# Patient Record
Sex: FEMALE | Race: BLACK OR AFRICAN AMERICAN | NOT HISPANIC OR LATINO
[De-identification: names, ages, dates, MRNs, and addresses within clinical notes are randomized per-mention and may not be internally consistent; named-entity substitution may affect disease eponyms.]

---

## 2017-03-22 ENCOUNTER — APPOINTMENT (OUTPATIENT)
Dept: DERMATOLOGY | Facility: CLINIC | Age: 32
End: 2017-03-22

## 2017-03-22 VITALS — DIASTOLIC BLOOD PRESSURE: 78 MMHG | SYSTOLIC BLOOD PRESSURE: 108 MMHG

## 2017-03-22 DIAGNOSIS — Z91.89 OTHER SPECIFIED PERSONAL RISK FACTORS, NOT ELSEWHERE CLASSIFIED: ICD-10-CM

## 2017-03-23 ENCOUNTER — TRANSCRIPTION ENCOUNTER (OUTPATIENT)
Age: 32
End: 2017-03-23

## 2017-03-30 ENCOUNTER — OTHER (OUTPATIENT)
Age: 32
End: 2017-03-30

## 2017-07-25 ENCOUNTER — EMERGENCY (EMERGENCY)
Facility: HOSPITAL | Age: 32
LOS: 1 days | Discharge: HOME | End: 2017-07-25
Admitting: EMERGENCY MEDICINE
Payer: COMMERCIAL

## 2017-07-25 VITALS
OXYGEN SATURATION: 100 % | HEART RATE: 78 BPM | RESPIRATION RATE: 18 BRPM | TEMPERATURE: 98 F | DIASTOLIC BLOOD PRESSURE: 80 MMHG | SYSTOLIC BLOOD PRESSURE: 115 MMHG

## 2017-07-25 DIAGNOSIS — Z98.1 ARTHRODESIS STATUS: Chronic | ICD-10-CM

## 2017-07-25 PROCEDURE — 71020: CPT | Mod: 26

## 2017-07-25 PROCEDURE — 99284 EMERGENCY DEPT VISIT MOD MDM: CPT

## 2017-07-25 NOTE — ED PROVIDER NOTE - MEDICAL DECISION MAKING DETAILS
Sudden onset of left sided upper back pain likely musculoskeletal, PERC negative, will obtain CXR, heat, nsaids, follow up PMD.

## 2017-07-25 NOTE — ED PROVIDER NOTE - OBJECTIVE STATEMENT
33 y/o female with 31 y/o female with a hx of Asthma presents to the ER 33 y/o female with a hx of Asthma presents to the ER c/o sudden onset of left sided upper back pain x1 day.  Pt states she was at work this AM and felt a pop in her back.  Pt reports pain with inspiration.  Pt denies chest pain, shortness of breath, recent travel, OCP use, weakness, dizziness, numbness, tingling, fevers, chills, cough, trauma.

## 2017-07-25 NOTE — ED PROVIDER NOTE - PHYSICAL EXAMINATION
+TTP left sided paraspinal thoracic region no midline, TTP.  Strength 5/5 all extremities, normal gait.

## 2018-03-05 ENCOUNTER — RESULT REVIEW (OUTPATIENT)
Age: 33
End: 2018-03-05

## 2018-04-05 ENCOUNTER — ASOB RESULT (OUTPATIENT)
Age: 33
End: 2018-04-05

## 2018-04-05 ENCOUNTER — APPOINTMENT (OUTPATIENT)
Dept: ANTEPARTUM | Facility: CLINIC | Age: 33
End: 2018-04-05
Payer: COMMERCIAL

## 2018-04-05 PROCEDURE — 36416 COLLJ CAPILLARY BLOOD SPEC: CPT

## 2018-04-05 PROCEDURE — 76801 OB US < 14 WKS SINGLE FETUS: CPT

## 2018-04-05 PROCEDURE — 76813 OB US NUCHAL MEAS 1 GEST: CPT

## 2018-04-18 ENCOUNTER — APPOINTMENT (OUTPATIENT)
Dept: OBGYN | Facility: CLINIC | Age: 33
End: 2018-04-18
Payer: COMMERCIAL

## 2018-04-18 PROCEDURE — 0500F INITIAL PRENATAL CARE VISIT: CPT

## 2018-05-08 ENCOUNTER — APPOINTMENT (OUTPATIENT)
Dept: OBGYN | Facility: CLINIC | Age: 33
End: 2018-05-08
Payer: COMMERCIAL

## 2018-05-08 PROCEDURE — 0502F SUBSEQUENT PRENATAL CARE: CPT

## 2018-05-08 PROCEDURE — 36415 COLL VENOUS BLD VENIPUNCTURE: CPT

## 2018-05-15 ENCOUNTER — OUTPATIENT (OUTPATIENT)
Dept: OUTPATIENT SERVICES | Facility: HOSPITAL | Age: 33
LOS: 1 days | Discharge: HOME | End: 2018-05-15

## 2018-05-15 DIAGNOSIS — Z98.1 ARTHRODESIS STATUS: Chronic | ICD-10-CM

## 2018-05-15 DIAGNOSIS — Z53.1 PROCEDURE AND TREATMENT NOT CARRIED OUT BECAUSE OF PATIENT'S DECISION FOR REASONS OF BELIEF AND GROUP PRESSURE: ICD-10-CM

## 2018-05-17 ENCOUNTER — APPOINTMENT (OUTPATIENT)
Dept: HEMATOLOGY ONCOLOGY | Facility: CLINIC | Age: 33
End: 2018-05-17
Payer: COMMERCIAL

## 2018-05-17 ENCOUNTER — RESULT REVIEW (OUTPATIENT)
Age: 33
End: 2018-05-17

## 2018-05-17 VITALS
OXYGEN SATURATION: 100 % | RESPIRATION RATE: 16 BRPM | HEART RATE: 93 BPM | WEIGHT: 148.81 LBS | TEMPERATURE: 98.3 F | SYSTOLIC BLOOD PRESSURE: 106 MMHG | HEIGHT: 60.28 IN | BODY MASS INDEX: 28.84 KG/M2 | DIASTOLIC BLOOD PRESSURE: 71 MMHG

## 2018-05-17 DIAGNOSIS — Z80.42 FAMILY HISTORY OF MALIGNANT NEOPLASM OF PROSTATE: ICD-10-CM

## 2018-05-17 DIAGNOSIS — Z78.9 OTHER SPECIFIED HEALTH STATUS: ICD-10-CM

## 2018-05-17 DIAGNOSIS — Z80.0 FAMILY HISTORY OF MALIGNANT NEOPLASM OF DIGESTIVE ORGANS: ICD-10-CM

## 2018-05-17 DIAGNOSIS — Z80.49 FAMILY HISTORY OF MALIGNANT NEOPLASM OF OTHER GENITAL ORGANS: ICD-10-CM

## 2018-05-17 DIAGNOSIS — Z53.1 PROCEDURE AND TREATMENT NOT CARRIED OUT BECAUSE OF PATIENT'S DECISION FOR REASONS OF BELIEF AND GROUP PRESSURE: ICD-10-CM

## 2018-05-17 LAB
HCT VFR BLD CALC: 34.9 % — SIGNIFICANT CHANGE UP (ref 34.5–45)
HGB BLD-MCNC: 12.4 G/DL — SIGNIFICANT CHANGE UP (ref 11.5–15.5)
MCHC RBC-ENTMCNC: 31.4 PG — SIGNIFICANT CHANGE UP (ref 27–34)
MCHC RBC-ENTMCNC: 35.6 G/DL — SIGNIFICANT CHANGE UP (ref 32–36)
MCV RBC AUTO: 88.1 FL — SIGNIFICANT CHANGE UP (ref 80–100)
PLATELET # BLD AUTO: 208 K/UL — SIGNIFICANT CHANGE UP (ref 150–400)
RBC # BLD: 3.96 M/UL — SIGNIFICANT CHANGE UP (ref 3.8–5.2)
RBC # FLD: 12.5 % — SIGNIFICANT CHANGE UP (ref 10.3–14.5)
WBC # BLD: 9.5 K/UL — SIGNIFICANT CHANGE UP (ref 3.8–10.5)
WBC # FLD AUTO: 9.5 K/UL — SIGNIFICANT CHANGE UP (ref 3.8–10.5)

## 2018-05-17 PROCEDURE — 99244 OFF/OP CNSLTJ NEW/EST MOD 40: CPT

## 2018-05-21 ENCOUNTER — NON-APPOINTMENT (OUTPATIENT)
Age: 33
End: 2018-05-21

## 2018-05-21 ENCOUNTER — APPOINTMENT (OUTPATIENT)
Dept: CARDIOLOGY | Facility: CLINIC | Age: 33
End: 2018-05-21
Payer: COMMERCIAL

## 2018-05-21 VITALS — HEART RATE: 96 BPM | DIASTOLIC BLOOD PRESSURE: 74 MMHG | SYSTOLIC BLOOD PRESSURE: 107 MMHG

## 2018-05-21 PROCEDURE — 93000 ELECTROCARDIOGRAM COMPLETE: CPT

## 2018-05-21 PROCEDURE — 99205 OFFICE O/P NEW HI 60 MIN: CPT

## 2018-06-04 ENCOUNTER — APPOINTMENT (OUTPATIENT)
Dept: OBGYN | Facility: CLINIC | Age: 33
End: 2018-06-04
Payer: COMMERCIAL

## 2018-06-04 PROCEDURE — 0502F SUBSEQUENT PRENATAL CARE: CPT

## 2018-06-05 ENCOUNTER — APPOINTMENT (OUTPATIENT)
Dept: ANTEPARTUM | Facility: CLINIC | Age: 33
End: 2018-06-05
Payer: COMMERCIAL

## 2018-06-05 ENCOUNTER — ASOB RESULT (OUTPATIENT)
Age: 33
End: 2018-06-05

## 2018-06-05 ENCOUNTER — OUTPATIENT (OUTPATIENT)
Dept: OUTPATIENT SERVICES | Facility: HOSPITAL | Age: 33
LOS: 1 days | End: 2018-06-05
Payer: COMMERCIAL

## 2018-06-05 DIAGNOSIS — Z3A.00 WEEKS OF GESTATION OF PREGNANCY NOT SPECIFIED: ICD-10-CM

## 2018-06-05 DIAGNOSIS — Z98.1 ARTHRODESIS STATUS: Chronic | ICD-10-CM

## 2018-06-05 DIAGNOSIS — O26.899 OTHER SPECIFIED PREGNANCY RELATED CONDITIONS, UNSPECIFIED TRIMESTER: ICD-10-CM

## 2018-06-05 PROCEDURE — 76811 OB US DETAILED SNGL FETUS: CPT

## 2018-06-05 PROCEDURE — 76817 TRANSVAGINAL US OBSTETRIC: CPT | Mod: 59

## 2018-06-05 PROCEDURE — G0463: CPT

## 2018-06-05 NOTE — CONSULT NOTE ADULT - SUBJECTIVE AND OBJECTIVE BOX
Dear Dr. Donohue    I had the pleasure of seeing Ms. Rae Cedeño in consultation today.  As you know, she is a  at 21  weeks gestation.  She presents today with a chief complaint of  being Jehovah Witness                                                     .          Past Medical History: scoliosis    Past Surgical History: spinal fusion for scoliosis  at  [facility]     Family History of Anesthetic Problems:   lethargic , difficulty breathing, resuscitated     Medications: prenatal vitamin daily, calcium daily    Allergies: NKDA     Physical Examination:  Height:   5'0'       Weight:     68.9 kg          Airway: M2 good opening , 3 FB    Impression: Dear Dr. Donohue    I had the pleasure of seeing Ms. Rae Cedeño in consultation today.  As you know, she is a  at 21  weeks gestation.  She presents today with a chief complaint of  being Jehovah Witness  , pt seen   hematologist Dr Katherine Morse for consultation                                                .          Past Medical History: scoliosis    Past Surgical History: spinal fusion for scoliosis  at  [facility]     Family History of Anesthetic Problems:   lethargic , difficulty breathing, resuscitated     Medications: prenatal vitamin daily, calcium daily    Allergies: NKDA     Physical Examination:  Height:   5'0'       Weight:     68.9 kg          Airway: M2 good opening , 3 FB    Impression:  This is a 33 y/o woman  with  with  EDC  10/14/2018 Dear Dr. Donohue    I had the pleasure of seeing Ms. Rae Cedeño in consultation today.  As you know, she is a  at 21  weeks gestation.  She presents today with a chief complaint of  being Jehovah Witness.                                              .          Past Medical History: scoliosis    Past Surgical History: spinal fusion for scoliosis  at  [facility]     Family History of Anesthetic Problems:   lethargic , difficulty breathing, resuscitated     Medications: prenatal vitamin daily, calcium daily    Allergies: NKDA     Physical Examination:  Height:   5'0'       Weight:     68.9 kg          Airway: M2 good opening , 3 FB    Impression:  This is a 31 y/o woman  with  with  EDC  10/14/2018,  healthy young woman who does not accept all variety of blood products. She has met with a hematologist Dr Katherine Morse  to discuss in detail the risks and benefits of a variety of blood components and man-made blood and coagulation substitutes. She understands that we will abide by her wishes regarding what she will and will not accept and will adhere to such wishes even in the event of death.     Thank you for the courtesy of this consultation. The final plan is per the on call team. Dear Dr. Donohue    I had the pleasure of seeing Ms. Rae Cedeño in consultation today.  As you know, she is a  at 21  weeks gestation.  She presents today with a chief complaint of  being Jehovah Witness.                                              .          Past Medical History: scoliosis    Past Surgical History: spinal fusion for scoliosis  at  [facility]     Family History of Anesthetic Problems:   lethargic , difficulty breathing, resuscitated     Medications: prenatal vitamin daily, calcium daily    Allergies: NKDA     Physical Examination:  Height:   5'0'       Weight:     68.9 kg          Airway: M2 good opening , 3 FB    Impression:  This is a 31 y/o woman  with  with  EDC  10/14/2018,  healthy young woman who does not accept all variety of blood products. She has met with a hematologist Dr Katherine Morse  to discuss in detail the risks and benefits of a variety of blood components and man-made blood and coagulation substitutes. She understands that we will abide by her wishes regarding what she will and will not accept and will adhere to such wishes even in the event of death.  Will obtain old anesthesia record from [facility] for complications with spinal fusion surgery    Thank you for the courtesy of this consultation. The final plan is per the on call team.     Sincerely,  Loren Snow NP Dear Dr. Donohue    I had the pleasure of seeing Ms. Rae Cedeño in consultation today.  As you know, she is a  at 21  weeks gestation.  She presents today with a chief complaint of  being Jehovah Witness.                                              .      Past Medical History: scoliosis    Past Surgical History: spinal fusion for scoliosis  at  [facility]     Family History of Anesthetic Problems:   lethargic , difficulty breathing, resuscitated     Medications: prenatal vitamin daily, calcium daily    Allergies: NKDA     Physical Examination:  Height:   5'0'       Weight:     68.9 kg          Airway: M2 good opening , 3 FB    Impression:  This is a 33 y/o woman  with  with  EDC  10/14/2018,  healthy young woman who does not accept all variety of blood products. She has met with a hematologist Dr Katherine Morse  to discuss in detail the risks and benefits of a variety of blood components and man-made blood and coagulation substitutes. She understands that we will abide by her wishes regarding what she will and will not accept and will adhere to such wishes even in the event of death.  Will obtain old anesthesia record from [facility] for complications with spinal fusion surgery    Thank you for the courtesy of this consultation. The final plan is per the on call team.     Sincerely,  Loren Snow NP Dear Dr. Donohue    I had the pleasure of seeing Ms. Rae Cedeño in consultation today.  As you know, she is a  at 21  weeks gestation.  She presents today for consult as she is a Mandaen and has a history of thoracic spinal fusion for scoliosis.  Since surgery, continues to have occasional back pain that sometimes radiates down her left leg.  Denies weakness.  Last seen by a physician for her spine in .                                                .    Past Medical History: scoliosis, childhood asthma    Past Surgical History: spinal fusion for scoliosis  at  [facility] .  States she had post-operative issues including "not waking up for 2 days" and having a "flatline."  Does not know the details.      Medications: prenatal vitamin daily, calcium daily    Allergies: NKDA     Physical Examination:  Height:   5'0'       Weight:     68.9 kg          Airway: M2 good opening , 3 FB    Impression:  This is a 33 y/o woman  with  with  EDC  10/14/2018,  healthy young woman who does not accept all variety of blood products. She has met with a hematologist Dr Katherine Morse  to discuss in detail the risks and benefits of a variety of blood components and man-made blood and coagulation substitutes. She understands that we will abide by her wishes regarding what she will and will not accept and will adhere to such wishes even in the event of death.  Will attempt to obtain old record from [facility] for complications with spinal fusion surgery.  Recommended that patient by a physician for her spine prior to delivery.  Patient instructed to bring consulation from physician as well as any imaging studies done.    Thank you for the courtesy of this consultation. The final plan is per the on call team.     Sincerely,  Loren Snow NP

## 2018-06-17 ENCOUNTER — EMERGENCY (EMERGENCY)
Facility: HOSPITAL | Age: 33
LOS: 1 days | Discharge: HOME | End: 2018-06-17
Attending: EMERGENCY MEDICINE | Admitting: EMERGENCY MEDICINE
Payer: COMMERCIAL

## 2018-06-17 VITALS
TEMPERATURE: 98 F | HEART RATE: 98 BPM | DIASTOLIC BLOOD PRESSURE: 79 MMHG | RESPIRATION RATE: 17 BRPM | OXYGEN SATURATION: 100 % | SYSTOLIC BLOOD PRESSURE: 112 MMHG

## 2018-06-17 VITALS
HEART RATE: 95 BPM | OXYGEN SATURATION: 100 % | TEMPERATURE: 98 F | DIASTOLIC BLOOD PRESSURE: 77 MMHG | RESPIRATION RATE: 16 BRPM | SYSTOLIC BLOOD PRESSURE: 115 MMHG

## 2018-06-17 DIAGNOSIS — Z98.1 ARTHRODESIS STATUS: Chronic | ICD-10-CM

## 2018-06-17 PROCEDURE — 99282 EMERGENCY DEPT VISIT SF MDM: CPT | Mod: 25

## 2018-06-17 PROCEDURE — 64450 NJX AA&/STRD OTHER PN/BRANCH: CPT | Mod: F3

## 2018-06-17 NOTE — ED PROCEDURE NOTE - GENERAL PROCEDURE DETAILS
Using ring cutter, ring was cut in one place and opened further using curved clams. Ring then removed from external finger, assisted by lubricant jelly.

## 2018-06-17 NOTE — ED ADULT NURSE NOTE - OBJECTIVE STATEMENT
pt received to the ed came in c/o of left ring finger swelling-unable to get ring off finger. no deficits in movement, pt endorses feeling in finger, finger feels warm to touch, slightly swollen around ring. pt denies tingling or any other symptoms. pt is 5 months pregnant and reports swelling to fingers normally but this time "worse". partner at bedside, will continue to monitor.

## 2018-06-17 NOTE — ED PROVIDER NOTE - PROGRESS NOTE DETAILS
Richelle att: Ring block performed and area tamponaded with string in anticipation of ring removal. Chris Babb MD PGY4: Pt signed out to me after multiple failed attempts to remove ring using conventional methods. This was deemed to be futile and I cut and removed the ring. Provided return precautions. Instructed pt to take ring to [facility] to attempt reconstruction.

## 2018-06-17 NOTE — ED PROVIDER NOTE - OBJECTIVE STATEMENT
05:40 Richelle att: 32F 05:40 Richelle att: 32F p/w ring stuck on finger. Patient reports since 2A unable to remove her wedding and engagement ring from her fourth finger. REports sensation intact. Of note patient is 5 months pregnant, denies abd or pelvic pain, contractions, gush of fluid, or vag bleed.

## 2018-06-17 NOTE — ED ADULT TRIAGE NOTE - CHIEF COMPLAINT QUOTE
Swelling and pain due to inability to take ring off 4th digit of right hand x 20-30minutes. Unable to assess cap refill due to acrylic nails denies any tingling. Patient is also 5months pregnant denies any ob complaints. Able to move affected digit. Denies Med Hx

## 2018-06-18 ENCOUNTER — NON-APPOINTMENT (OUTPATIENT)
Age: 33
End: 2018-06-18

## 2018-06-18 ENCOUNTER — APPOINTMENT (OUTPATIENT)
Dept: CARDIOLOGY | Facility: CLINIC | Age: 33
End: 2018-06-18
Payer: COMMERCIAL

## 2018-06-18 VITALS — SYSTOLIC BLOOD PRESSURE: 108 MMHG | DIASTOLIC BLOOD PRESSURE: 75 MMHG | HEART RATE: 103 BPM

## 2018-06-18 PROCEDURE — 99214 OFFICE O/P EST MOD 30 MIN: CPT

## 2018-06-18 PROCEDURE — 93000 ELECTROCARDIOGRAM COMPLETE: CPT

## 2018-06-29 ENCOUNTER — APPOINTMENT (OUTPATIENT)
Dept: CV DIAGNOSITCS | Facility: HOSPITAL | Age: 33
End: 2018-06-29

## 2018-06-29 ENCOUNTER — OUTPATIENT (OUTPATIENT)
Dept: OUTPATIENT SERVICES | Facility: HOSPITAL | Age: 33
LOS: 1 days | End: 2018-06-29
Payer: COMMERCIAL

## 2018-06-29 DIAGNOSIS — R00.2 PALPITATIONS: ICD-10-CM

## 2018-06-29 DIAGNOSIS — Z98.1 ARTHRODESIS STATUS: Chronic | ICD-10-CM

## 2018-06-29 PROCEDURE — 93306 TTE W/DOPPLER COMPLETE: CPT | Mod: 26

## 2018-06-29 PROCEDURE — 93306 TTE W/DOPPLER COMPLETE: CPT

## 2018-07-02 ENCOUNTER — APPOINTMENT (OUTPATIENT)
Dept: OBGYN | Facility: CLINIC | Age: 33
End: 2018-07-02
Payer: COMMERCIAL

## 2018-07-02 PROCEDURE — 0502F SUBSEQUENT PRENATAL CARE: CPT

## 2018-07-13 ENCOUNTER — APPOINTMENT (OUTPATIENT)
Dept: PHYSICAL MEDICINE AND REHAB | Facility: CLINIC | Age: 33
End: 2018-07-13
Payer: COMMERCIAL

## 2018-07-13 VITALS
OXYGEN SATURATION: 100 % | HEART RATE: 103 BPM | DIASTOLIC BLOOD PRESSURE: 80 MMHG | TEMPERATURE: 98.1 F | SYSTOLIC BLOOD PRESSURE: 114 MMHG

## 2018-07-13 PROCEDURE — 99203 OFFICE O/P NEW LOW 30 MIN: CPT

## 2018-07-23 ENCOUNTER — NON-APPOINTMENT (OUTPATIENT)
Age: 33
End: 2018-07-23

## 2018-07-23 ENCOUNTER — APPOINTMENT (OUTPATIENT)
Dept: CARDIOLOGY | Facility: CLINIC | Age: 33
End: 2018-07-23
Payer: COMMERCIAL

## 2018-07-23 VITALS
HEIGHT: 60 IN | OXYGEN SATURATION: 98 % | WEIGHT: 160 LBS | SYSTOLIC BLOOD PRESSURE: 109 MMHG | HEART RATE: 99 BPM | BODY MASS INDEX: 31.41 KG/M2 | DIASTOLIC BLOOD PRESSURE: 73 MMHG

## 2018-07-23 PROCEDURE — 99214 OFFICE O/P EST MOD 30 MIN: CPT

## 2018-07-23 PROCEDURE — 93000 ELECTROCARDIOGRAM COMPLETE: CPT

## 2018-07-24 ENCOUNTER — APPOINTMENT (OUTPATIENT)
Dept: OBGYN | Facility: CLINIC | Age: 33
End: 2018-07-24
Payer: COMMERCIAL

## 2018-07-24 PROCEDURE — 36415 COLL VENOUS BLD VENIPUNCTURE: CPT

## 2018-07-24 PROCEDURE — 0502F SUBSEQUENT PRENATAL CARE: CPT

## 2018-08-08 ENCOUNTER — APPOINTMENT (OUTPATIENT)
Dept: OBGYN | Facility: CLINIC | Age: 33
End: 2018-08-08
Payer: COMMERCIAL

## 2018-08-08 PROCEDURE — 0502F SUBSEQUENT PRENATAL CARE: CPT

## 2018-08-14 ENCOUNTER — ASOB RESULT (OUTPATIENT)
Age: 33
End: 2018-08-14

## 2018-08-14 ENCOUNTER — APPOINTMENT (OUTPATIENT)
Dept: MATERNAL FETAL MEDICINE | Facility: CLINIC | Age: 33
End: 2018-08-14
Payer: COMMERCIAL

## 2018-08-14 PROCEDURE — G0109 DIAB MANAGE TRN IND/GROUP: CPT

## 2018-08-15 ENCOUNTER — APPOINTMENT (OUTPATIENT)
Dept: MATERNAL FETAL MEDICINE | Facility: CLINIC | Age: 33
End: 2018-08-15
Payer: COMMERCIAL

## 2018-08-15 PROCEDURE — G0109 DIAB MANAGE TRN IND/GROUP: CPT

## 2018-08-17 ENCOUNTER — APPOINTMENT (OUTPATIENT)
Dept: OBGYN | Facility: CLINIC | Age: 33
End: 2018-08-17
Payer: COMMERCIAL

## 2018-08-17 PROCEDURE — 76816 OB US FOLLOW-UP PER FETUS: CPT

## 2018-08-17 PROCEDURE — 76819 FETAL BIOPHYS PROFIL W/O NST: CPT

## 2018-08-21 ENCOUNTER — ASOB RESULT (OUTPATIENT)
Age: 33
End: 2018-08-21

## 2018-08-21 ENCOUNTER — APPOINTMENT (OUTPATIENT)
Dept: ANTEPARTUM | Facility: CLINIC | Age: 33
End: 2018-08-21
Payer: COMMERCIAL

## 2018-08-21 PROCEDURE — 76816 OB US FOLLOW-UP PER FETUS: CPT

## 2018-08-23 ENCOUNTER — ASOB RESULT (OUTPATIENT)
Age: 33
End: 2018-08-23

## 2018-08-23 ENCOUNTER — APPOINTMENT (OUTPATIENT)
Dept: MATERNAL FETAL MEDICINE | Facility: CLINIC | Age: 33
End: 2018-08-23
Payer: COMMERCIAL

## 2018-08-23 PROCEDURE — G0108 DIAB MANAGE TRN  PER INDIV: CPT

## 2018-08-24 ENCOUNTER — APPOINTMENT (OUTPATIENT)
Dept: OBGYN | Facility: CLINIC | Age: 33
End: 2018-08-24
Payer: COMMERCIAL

## 2018-08-24 PROCEDURE — 59025 FETAL NON-STRESS TEST: CPT

## 2018-08-24 PROCEDURE — 0502F SUBSEQUENT PRENATAL CARE: CPT

## 2018-08-27 ENCOUNTER — APPOINTMENT (OUTPATIENT)
Dept: ANTEPARTUM | Facility: CLINIC | Age: 33
End: 2018-08-27
Payer: COMMERCIAL

## 2018-08-27 ENCOUNTER — ASOB RESULT (OUTPATIENT)
Age: 33
End: 2018-08-27

## 2018-08-27 PROCEDURE — 76819 FETAL BIOPHYS PROFIL W/O NST: CPT

## 2018-08-29 ENCOUNTER — APPOINTMENT (OUTPATIENT)
Dept: OBGYN | Facility: CLINIC | Age: 33
End: 2018-08-29
Payer: COMMERCIAL

## 2018-08-29 ENCOUNTER — OUTPATIENT (OUTPATIENT)
Dept: OUTPATIENT SERVICES | Facility: HOSPITAL | Age: 33
LOS: 1 days | End: 2018-08-29

## 2018-08-29 ENCOUNTER — EMERGENCY (EMERGENCY)
Facility: HOSPITAL | Age: 33
LOS: 1 days | Discharge: STILL ADMITTED OR ADMINISTRATIVE | End: 2018-08-29
Admitting: EMERGENCY MEDICINE

## 2018-08-29 VITALS
RESPIRATION RATE: 18 BRPM | OXYGEN SATURATION: 98 % | HEART RATE: 120 BPM | SYSTOLIC BLOOD PRESSURE: 108 MMHG | DIASTOLIC BLOOD PRESSURE: 81 MMHG | TEMPERATURE: 98 F

## 2018-08-29 DIAGNOSIS — Z98.1 ARTHRODESIS STATUS: Chronic | ICD-10-CM

## 2018-08-29 DIAGNOSIS — Z3A.00 WEEKS OF GESTATION OF PREGNANCY NOT SPECIFIED: ICD-10-CM

## 2018-08-29 DIAGNOSIS — O26.90 PREGNANCY RELATED CONDITIONS, UNSPECIFIED, UNSPECIFIED TRIMESTER: ICD-10-CM

## 2018-08-29 PROCEDURE — 36415 COLL VENOUS BLD VENIPUNCTURE: CPT

## 2018-08-29 PROCEDURE — 0502F SUBSEQUENT PRENATAL CARE: CPT

## 2018-08-29 RX ADMIN — Medication 10 MILLIGRAM(S): at 02:55

## 2018-08-29 NOTE — ED ADULT TRIAGE NOTE - CHIEF COMPLAINT QUOTE
"I have severe constipation. I think I may have fecal impaction." Patient is  approx. 33 weeks pregnant c/o constipation with last BM 7 days ago on . Patient took Colace yesterday and today, patient began to have urge to move bowels, but is still unable to. Denies nausea, vomiting, diarrhea, dysuria, hematuria, vaginal bleeding/discharge. "I have severe constipation. I think I may have fecal impaction." Patient is  approx. 33 weeks pregnant c/o constipation with last BM 7 days ago on . Patient took Colace yesterday and today, patient began to have urge to move bowels, but is still unable to. Denies nausea, vomiting, diarrhea, dysuria, hematuria, vaginal bleeding/discharge. Called L&D, spoke with Ginny, patient to be evaluated in L&D.

## 2018-08-29 NOTE — ED ADULT NURSE NOTE - CHIEF COMPLAINT QUOTE
"I have severe constipation. I think I may have fecal impaction." Patient is  approx. 33 weeks pregnant c/o constipation with last BM 7 days ago on . Patient took Colace yesterday and today, patient began to have urge to move bowels, but is still unable to. Denies nausea, vomiting, diarrhea, dysuria, hematuria, vaginal bleeding/discharge. Called L&D, spoke with Ginny, patient to be evaluated in L&D.

## 2018-08-31 ENCOUNTER — APPOINTMENT (OUTPATIENT)
Dept: OBGYN | Facility: CLINIC | Age: 33
End: 2018-08-31

## 2018-09-05 ENCOUNTER — APPOINTMENT (OUTPATIENT)
Dept: ANTEPARTUM | Facility: CLINIC | Age: 33
End: 2018-09-05

## 2018-09-06 ENCOUNTER — APPOINTMENT (OUTPATIENT)
Dept: OBGYN | Facility: CLINIC | Age: 33
End: 2018-09-06
Payer: COMMERCIAL

## 2018-09-06 PROCEDURE — 90715 TDAP VACCINE 7 YRS/> IM: CPT

## 2018-09-06 PROCEDURE — 0502F SUBSEQUENT PRENATAL CARE: CPT

## 2018-09-06 PROCEDURE — 90471 IMMUNIZATION ADMIN: CPT

## 2018-09-06 PROCEDURE — 59025 FETAL NON-STRESS TEST: CPT

## 2018-09-07 ENCOUNTER — APPOINTMENT (OUTPATIENT)
Dept: MATERNAL FETAL MEDICINE | Facility: CLINIC | Age: 33
End: 2018-09-07
Payer: COMMERCIAL

## 2018-09-07 ENCOUNTER — ASOB RESULT (OUTPATIENT)
Age: 33
End: 2018-09-07

## 2018-09-07 PROCEDURE — G0108 DIAB MANAGE TRN  PER INDIV: CPT

## 2018-09-10 ENCOUNTER — APPOINTMENT (OUTPATIENT)
Dept: OBGYN | Facility: CLINIC | Age: 33
End: 2018-09-10
Payer: COMMERCIAL

## 2018-09-10 ENCOUNTER — APPOINTMENT (OUTPATIENT)
Dept: CARDIOLOGY | Facility: CLINIC | Age: 33
End: 2018-09-10

## 2018-09-10 PROCEDURE — 0502F SUBSEQUENT PRENATAL CARE: CPT

## 2018-09-10 PROCEDURE — 59025 FETAL NON-STRESS TEST: CPT

## 2018-09-19 ENCOUNTER — APPOINTMENT (OUTPATIENT)
Dept: OBGYN | Facility: CLINIC | Age: 33
End: 2018-09-19
Payer: COMMERCIAL

## 2018-09-19 PROCEDURE — 76816 OB US FOLLOW-UP PER FETUS: CPT

## 2018-09-19 PROCEDURE — 0502F SUBSEQUENT PRENATAL CARE: CPT

## 2018-09-19 PROCEDURE — 59025 FETAL NON-STRESS TEST: CPT

## 2018-09-21 ENCOUNTER — APPOINTMENT (OUTPATIENT)
Dept: MATERNAL FETAL MEDICINE | Facility: CLINIC | Age: 33
End: 2018-09-21
Payer: COMMERCIAL

## 2018-09-21 ENCOUNTER — ASOB RESULT (OUTPATIENT)
Age: 33
End: 2018-09-21

## 2018-09-21 PROCEDURE — G0108 DIAB MANAGE TRN  PER INDIV: CPT

## 2018-09-27 ENCOUNTER — APPOINTMENT (OUTPATIENT)
Dept: OBGYN | Facility: CLINIC | Age: 33
End: 2018-09-27
Payer: COMMERCIAL

## 2018-09-27 PROCEDURE — 0502F SUBSEQUENT PRENATAL CARE: CPT

## 2018-09-27 PROCEDURE — 76818 FETAL BIOPHYS PROFILE W/NST: CPT

## 2018-10-03 ENCOUNTER — APPOINTMENT (OUTPATIENT)
Dept: OBGYN | Facility: CLINIC | Age: 33
End: 2018-10-03
Payer: COMMERCIAL

## 2018-10-03 PROCEDURE — 0502F SUBSEQUENT PRENATAL CARE: CPT

## 2018-10-03 PROCEDURE — 76818 FETAL BIOPHYS PROFILE W/NST: CPT

## 2018-10-05 ENCOUNTER — ASOB RESULT (OUTPATIENT)
Age: 33
End: 2018-10-05

## 2018-10-05 ENCOUNTER — APPOINTMENT (OUTPATIENT)
Dept: MATERNAL FETAL MEDICINE | Facility: CLINIC | Age: 33
End: 2018-10-05
Payer: COMMERCIAL

## 2018-10-05 PROCEDURE — G0108 DIAB MANAGE TRN  PER INDIV: CPT

## 2018-10-11 ENCOUNTER — APPOINTMENT (OUTPATIENT)
Dept: OBGYN | Facility: CLINIC | Age: 33
End: 2018-10-11
Payer: COMMERCIAL

## 2018-10-11 PROCEDURE — 76816 OB US FOLLOW-UP PER FETUS: CPT

## 2018-10-11 PROCEDURE — 76818 FETAL BIOPHYS PROFILE W/NST: CPT

## 2018-10-11 PROCEDURE — 0502F SUBSEQUENT PRENATAL CARE: CPT

## 2018-10-17 ENCOUNTER — INPATIENT (INPATIENT)
Facility: HOSPITAL | Age: 33
LOS: 3 days | Discharge: HOME | End: 2018-10-21
Attending: OBSTETRICS & GYNECOLOGY | Admitting: OBSTETRICS & GYNECOLOGY
Payer: COMMERCIAL

## 2018-10-17 VITALS — WEIGHT: 160.94 LBS | HEIGHT: 59 IN

## 2018-10-17 DIAGNOSIS — O24.410 GESTATIONAL DIABETES MELLITUS IN PREGNANCY, DIET CONTROLLED: ICD-10-CM

## 2018-10-17 DIAGNOSIS — Z98.1 ARTHRODESIS STATUS: Chronic | ICD-10-CM

## 2018-10-17 LAB
BASOPHILS # BLD AUTO: 0.1 K/UL — SIGNIFICANT CHANGE UP (ref 0–0.2)
BASOPHILS NFR BLD AUTO: 1.1 % — SIGNIFICANT CHANGE UP (ref 0–2)
BLD GP AB SCN SERPL QL: NEGATIVE — SIGNIFICANT CHANGE UP
EOSINOPHIL # BLD AUTO: 0 K/UL — SIGNIFICANT CHANGE UP (ref 0–0.5)
EOSINOPHIL NFR BLD AUTO: 0.9 % — SIGNIFICANT CHANGE UP (ref 0–6)
GLUCOSE BLDC GLUCOMTR-MCNC: 73 MG/DL — SIGNIFICANT CHANGE UP (ref 70–99)
GLUCOSE BLDC GLUCOMTR-MCNC: 74 MG/DL — SIGNIFICANT CHANGE UP (ref 70–99)
GLUCOSE BLDC GLUCOMTR-MCNC: 77 MG/DL — SIGNIFICANT CHANGE UP (ref 70–99)
GLUCOSE BLDC GLUCOMTR-MCNC: 77 MG/DL — SIGNIFICANT CHANGE UP (ref 70–99)
GLUCOSE BLDC GLUCOMTR-MCNC: 86 MG/DL — SIGNIFICANT CHANGE UP (ref 70–99)
GLUCOSE BLDC GLUCOMTR-MCNC: 87 MG/DL — SIGNIFICANT CHANGE UP (ref 70–99)
GLUCOSE BLDC GLUCOMTR-MCNC: 89 MG/DL — SIGNIFICANT CHANGE UP (ref 70–99)
HCT VFR BLD CALC: 42.3 % — SIGNIFICANT CHANGE UP (ref 34.5–45)
HGB BLD-MCNC: 14.4 G/DL — SIGNIFICANT CHANGE UP (ref 11.5–15.5)
LYMPHOCYTES # BLD AUTO: 1.9 K/UL — SIGNIFICANT CHANGE UP (ref 1–3.3)
LYMPHOCYTES # BLD AUTO: 37.5 % — SIGNIFICANT CHANGE UP (ref 13–44)
MCHC RBC-ENTMCNC: 30.2 PG — SIGNIFICANT CHANGE UP (ref 27–34)
MCHC RBC-ENTMCNC: 34 GM/DL — SIGNIFICANT CHANGE UP (ref 32–36)
MCV RBC AUTO: 89 FL — SIGNIFICANT CHANGE UP (ref 80–100)
MONOCYTES # BLD AUTO: 0.5 K/UL — SIGNIFICANT CHANGE UP (ref 0–0.9)
MONOCYTES NFR BLD AUTO: 8.8 % — SIGNIFICANT CHANGE UP (ref 2–14)
NEUTROPHILS # BLD AUTO: 2.7 K/UL — SIGNIFICANT CHANGE UP (ref 1.8–7.4)
NEUTROPHILS NFR BLD AUTO: 51.7 % — SIGNIFICANT CHANGE UP (ref 43–77)
PLATELET # BLD AUTO: 232 K/UL — SIGNIFICANT CHANGE UP (ref 150–400)
RBC # BLD: 4.75 M/UL — SIGNIFICANT CHANGE UP (ref 3.8–5.2)
RBC # FLD: 13.6 % — SIGNIFICANT CHANGE UP (ref 10.3–14.5)
RH IG SCN BLD-IMP: POSITIVE — SIGNIFICANT CHANGE UP
T PALLIDUM AB TITR SER: NEGATIVE — SIGNIFICANT CHANGE UP
WBC # BLD: 5.2 K/UL — SIGNIFICANT CHANGE UP (ref 3.8–10.5)
WBC # FLD AUTO: 5.2 K/UL — SIGNIFICANT CHANGE UP (ref 3.8–10.5)

## 2018-10-17 RX ORDER — SODIUM CHLORIDE 9 MG/ML
1000 INJECTION, SOLUTION INTRAVENOUS
Qty: 0 | Refills: 0 | Status: DISCONTINUED | OUTPATIENT
Start: 2018-10-17 | End: 2018-10-19

## 2018-10-17 RX ORDER — OXYTOCIN 10 UNIT/ML
4 VIAL (ML) INJECTION
Qty: 30 | Refills: 0 | Status: DISCONTINUED | OUTPATIENT
Start: 2018-10-17 | End: 2018-10-18

## 2018-10-17 RX ORDER — ONDANSETRON 8 MG/1
4 TABLET, FILM COATED ORAL ONCE
Qty: 0 | Refills: 0 | Status: DISCONTINUED | OUTPATIENT
Start: 2018-10-17 | End: 2018-10-21

## 2018-10-17 RX ORDER — SODIUM CHLORIDE 9 MG/ML
500 INJECTION INTRAMUSCULAR; INTRAVENOUS; SUBCUTANEOUS ONCE
Qty: 0 | Refills: 0 | Status: DISCONTINUED | OUTPATIENT
Start: 2018-10-17 | End: 2018-10-19

## 2018-10-17 RX ORDER — SODIUM CHLORIDE 9 MG/ML
1000 INJECTION INTRAMUSCULAR; INTRAVENOUS; SUBCUTANEOUS
Qty: 0 | Refills: 0 | Status: DISCONTINUED | OUTPATIENT
Start: 2018-10-17 | End: 2018-10-19

## 2018-10-17 RX ORDER — CITRIC ACID/SODIUM CITRATE 300-500 MG
15 SOLUTION, ORAL ORAL EVERY 4 HOURS
Qty: 0 | Refills: 0 | Status: DISCONTINUED | OUTPATIENT
Start: 2018-10-17 | End: 2018-10-18

## 2018-10-17 RX ORDER — SODIUM CHLORIDE 9 MG/ML
500 INJECTION, SOLUTION INTRAVENOUS ONCE
Qty: 0 | Refills: 0 | Status: DISCONTINUED | OUTPATIENT
Start: 2018-10-17 | End: 2018-10-17

## 2018-10-17 RX ORDER — INFLUENZA VIRUS VACCINE 15; 15; 15; 15 UG/.5ML; UG/.5ML; UG/.5ML; UG/.5ML
0.5 SUSPENSION INTRAMUSCULAR ONCE
Qty: 0 | Refills: 0 | Status: DISCONTINUED | OUTPATIENT
Start: 2018-10-17 | End: 2018-10-21

## 2018-10-17 RX ORDER — OXYTOCIN 10 UNIT/ML
333.33 VIAL (ML) INJECTION
Qty: 20 | Refills: 0 | Status: DISCONTINUED | OUTPATIENT
Start: 2018-10-17 | End: 2018-10-18

## 2018-10-17 RX ORDER — SODIUM CHLORIDE 9 MG/ML
1000 INJECTION, SOLUTION INTRAVENOUS
Qty: 0 | Refills: 0 | Status: DISCONTINUED | OUTPATIENT
Start: 2018-10-17 | End: 2018-10-17

## 2018-10-17 RX ADMIN — SODIUM CHLORIDE 125 MILLILITER(S): 9 INJECTION, SOLUTION INTRAVENOUS at 00:50

## 2018-10-17 RX ADMIN — SODIUM CHLORIDE 125 MILLILITER(S): 9 INJECTION INTRAMUSCULAR; INTRAVENOUS; SUBCUTANEOUS at 00:50

## 2018-10-17 RX ADMIN — SODIUM CHLORIDE 125 MILLILITER(S): 9 INJECTION, SOLUTION INTRAVENOUS at 08:47

## 2018-10-17 RX ADMIN — Medication 15 MILLILITER(S): at 13:07

## 2018-10-17 RX ADMIN — Medication 15 MILLILITER(S): at 08:58

## 2018-10-17 RX ADMIN — Medication 4 MILLIUNIT(S)/MIN: at 23:23

## 2018-10-18 LAB
ALBUMIN SERPL ELPH-MCNC: 2.5 G/DL — LOW (ref 3.3–5)
ALP SERPL-CCNC: 155 U/L — HIGH (ref 40–120)
ALT FLD-CCNC: 17 U/L — SIGNIFICANT CHANGE UP (ref 10–45)
ANION GAP SERPL CALC-SCNC: 10 MMOL/L — SIGNIFICANT CHANGE UP (ref 5–17)
APTT BLD: 23.8 SEC — LOW (ref 27.5–37.4)
APTT BLD: 30.1 SEC — SIGNIFICANT CHANGE UP (ref 27.5–37.4)
AST SERPL-CCNC: 24 U/L — SIGNIFICANT CHANGE UP (ref 10–40)
BASOPHILS # BLD AUTO: 0 K/UL — SIGNIFICANT CHANGE UP (ref 0–0.2)
BASOPHILS # BLD AUTO: 0 K/UL — SIGNIFICANT CHANGE UP (ref 0–0.2)
BASOPHILS NFR BLD AUTO: 0.1 % — SIGNIFICANT CHANGE UP (ref 0–2)
BASOPHILS NFR BLD AUTO: 0.3 % — SIGNIFICANT CHANGE UP (ref 0–2)
BILIRUB SERPL-MCNC: 1.2 MG/DL — SIGNIFICANT CHANGE UP (ref 0.2–1.2)
BUN SERPL-MCNC: 5 MG/DL — LOW (ref 7–23)
CALCIUM SERPL-MCNC: 8.6 MG/DL — SIGNIFICANT CHANGE UP (ref 8.4–10.5)
CHLORIDE SERPL-SCNC: 117 MMOL/L — HIGH (ref 96–108)
CO2 SERPL-SCNC: 17 MMOL/L — LOW (ref 22–31)
CREAT SERPL-MCNC: 0.9 MG/DL — SIGNIFICANT CHANGE UP (ref 0.5–1.3)
EOSINOPHIL # BLD AUTO: 0 K/UL — SIGNIFICANT CHANGE UP (ref 0–0.5)
EOSINOPHIL # BLD AUTO: 0 K/UL — SIGNIFICANT CHANGE UP (ref 0–0.5)
EOSINOPHIL NFR BLD AUTO: 0 % — SIGNIFICANT CHANGE UP (ref 0–6)
EOSINOPHIL NFR BLD AUTO: 0.1 % — SIGNIFICANT CHANGE UP (ref 0–6)
GLUCOSE BLDC GLUCOMTR-MCNC: 111 MG/DL — HIGH (ref 70–99)
GLUCOSE BLDC GLUCOMTR-MCNC: 120 MG/DL — HIGH (ref 70–99)
GLUCOSE BLDC GLUCOMTR-MCNC: 122 MG/DL — HIGH (ref 70–99)
GLUCOSE BLDC GLUCOMTR-MCNC: 89 MG/DL — SIGNIFICANT CHANGE UP (ref 70–99)
GLUCOSE SERPL-MCNC: 101 MG/DL — HIGH (ref 70–99)
HCT VFR BLD CALC: 30 % — LOW (ref 34.5–45)
HCT VFR BLD CALC: 35 % — SIGNIFICANT CHANGE UP (ref 34.5–45)
HCT VFR BLD CALC: 35.3 % — SIGNIFICANT CHANGE UP (ref 34.5–45)
HGB BLD-MCNC: 10.5 G/DL — LOW (ref 11.5–15.5)
HGB BLD-MCNC: 11.8 G/DL — SIGNIFICANT CHANGE UP (ref 11.5–15.5)
HGB BLD-MCNC: 11.9 G/DL — SIGNIFICANT CHANGE UP (ref 11.5–15.5)
INR BLD: 0.98 RATIO — SIGNIFICANT CHANGE UP (ref 0.88–1.16)
INR BLD: 1 RATIO — SIGNIFICANT CHANGE UP (ref 0.88–1.16)
LACTATE SERPL-SCNC: 1.1 MMOL/L — SIGNIFICANT CHANGE UP (ref 0.7–2)
LACTATE SERPL-SCNC: 3 MMOL/L — HIGH (ref 0.7–2)
LACTATE SERPL-SCNC: 3.7 MMOL/L — HIGH (ref 0.7–2)
LYMPHOCYTES # BLD AUTO: 0.8 K/UL — LOW (ref 1–3.3)
LYMPHOCYTES # BLD AUTO: 0.9 K/UL — LOW (ref 1–3.3)
LYMPHOCYTES # BLD AUTO: 6.3 % — LOW (ref 13–44)
LYMPHOCYTES # BLD AUTO: 6.6 % — LOW (ref 13–44)
MCHC RBC-ENTMCNC: 30.4 PG — SIGNIFICANT CHANGE UP (ref 27–34)
MCHC RBC-ENTMCNC: 30.6 PG — SIGNIFICANT CHANGE UP (ref 27–34)
MCHC RBC-ENTMCNC: 31.5 PG — SIGNIFICANT CHANGE UP (ref 27–34)
MCHC RBC-ENTMCNC: 33.7 GM/DL — SIGNIFICANT CHANGE UP (ref 32–36)
MCHC RBC-ENTMCNC: 33.7 GM/DL — SIGNIFICANT CHANGE UP (ref 32–36)
MCHC RBC-ENTMCNC: 35 GM/DL — SIGNIFICANT CHANGE UP (ref 32–36)
MCV RBC AUTO: 90 FL — SIGNIFICANT CHANGE UP (ref 80–100)
MCV RBC AUTO: 90 FL — SIGNIFICANT CHANGE UP (ref 80–100)
MCV RBC AUTO: 90.6 FL — SIGNIFICANT CHANGE UP (ref 80–100)
MONOCYTES # BLD AUTO: 1.1 K/UL — HIGH (ref 0–0.9)
MONOCYTES # BLD AUTO: 1.3 K/UL — HIGH (ref 0–0.9)
MONOCYTES NFR BLD AUTO: 8.4 % — SIGNIFICANT CHANGE UP (ref 2–14)
MONOCYTES NFR BLD AUTO: 9.4 % — SIGNIFICANT CHANGE UP (ref 2–14)
NEUTROPHILS # BLD AUTO: 11.4 K/UL — HIGH (ref 1.8–7.4)
NEUTROPHILS # BLD AUTO: 11.4 K/UL — HIGH (ref 1.8–7.4)
NEUTROPHILS NFR BLD AUTO: 83.7 % — HIGH (ref 43–77)
NEUTROPHILS NFR BLD AUTO: 85.2 % — HIGH (ref 43–77)
PLATELET # BLD AUTO: 176 K/UL — SIGNIFICANT CHANGE UP (ref 150–400)
PLATELET # BLD AUTO: 191 K/UL — SIGNIFICANT CHANGE UP (ref 150–400)
PLATELET # BLD AUTO: 200 K/UL — SIGNIFICANT CHANGE UP (ref 150–400)
POTASSIUM SERPL-MCNC: 4 MMOL/L — SIGNIFICANT CHANGE UP (ref 3.5–5.3)
POTASSIUM SERPL-SCNC: 4 MMOL/L — SIGNIFICANT CHANGE UP (ref 3.5–5.3)
PROT SERPL-MCNC: 5 G/DL — LOW (ref 6–8.3)
PROTHROM AB SERPL-ACNC: 10.7 SEC — SIGNIFICANT CHANGE UP (ref 9.8–12.7)
PROTHROM AB SERPL-ACNC: 10.8 SEC — SIGNIFICANT CHANGE UP (ref 9.8–12.7)
RBC # BLD: 3.33 M/UL — LOW (ref 3.8–5.2)
RBC # BLD: 3.86 M/UL — SIGNIFICANT CHANGE UP (ref 3.8–5.2)
RBC # BLD: 3.93 M/UL — SIGNIFICANT CHANGE UP (ref 3.8–5.2)
RBC # FLD: 12.9 % — SIGNIFICANT CHANGE UP (ref 10.3–14.5)
RBC # FLD: 13.5 % — SIGNIFICANT CHANGE UP (ref 10.3–14.5)
RBC # FLD: 13.5 % — SIGNIFICANT CHANGE UP (ref 10.3–14.5)
SODIUM SERPL-SCNC: 144 MMOL/L — SIGNIFICANT CHANGE UP (ref 135–145)
WBC # BLD: 13.3 K/UL — HIGH (ref 3.8–10.5)
WBC # BLD: 13.6 K/UL — HIGH (ref 3.8–10.5)
WBC # BLD: 13.6 K/UL — HIGH (ref 3.8–10.5)
WBC # FLD AUTO: 13.3 K/UL — HIGH (ref 3.8–10.5)
WBC # FLD AUTO: 13.6 K/UL — HIGH (ref 3.8–10.5)
WBC # FLD AUTO: 13.6 K/UL — HIGH (ref 3.8–10.5)

## 2018-10-18 PROCEDURE — 59400 OBSTETRICAL CARE: CPT

## 2018-10-18 RX ORDER — OXYCODONE HYDROCHLORIDE 5 MG/1
5 TABLET ORAL EVERY 4 HOURS
Qty: 0 | Refills: 0 | Status: DISCONTINUED | OUTPATIENT
Start: 2018-10-18 | End: 2018-10-21

## 2018-10-18 RX ORDER — CEFAZOLIN SODIUM 1 G
2000 VIAL (EA) INJECTION ONCE
Qty: 0 | Refills: 0 | Status: COMPLETED | OUTPATIENT
Start: 2018-10-18 | End: 2018-10-18

## 2018-10-18 RX ORDER — OXYCODONE HYDROCHLORIDE 5 MG/1
5 TABLET ORAL
Qty: 0 | Refills: 0 | Status: DISCONTINUED | OUTPATIENT
Start: 2018-10-18 | End: 2018-10-21

## 2018-10-18 RX ORDER — PROTHROMBIN COMPLEX CONCENTRATE (HUMAN) 25.5; 16.5; 24; 22; 22; 26 [IU]/ML; [IU]/ML; [IU]/ML; [IU]/ML; [IU]/ML; [IU]/ML
1500 POWDER, FOR SOLUTION INTRAVENOUS ONCE
Qty: 0 | Refills: 0 | Status: COMPLETED | OUTPATIENT
Start: 2018-10-18 | End: 2018-10-18

## 2018-10-18 RX ORDER — ACETAMINOPHEN 500 MG
975 TABLET ORAL EVERY 6 HOURS
Qty: 0 | Refills: 0 | Status: COMPLETED | OUTPATIENT
Start: 2018-10-18 | End: 2019-09-16

## 2018-10-18 RX ORDER — AER TRAVELER 0.5 G/1
1 SOLUTION RECTAL; TOPICAL EVERY 4 HOURS
Qty: 0 | Refills: 0 | Status: DISCONTINUED | OUTPATIENT
Start: 2018-10-18 | End: 2018-10-21

## 2018-10-18 RX ORDER — SODIUM CHLORIDE 9 MG/ML
3 INJECTION INTRAMUSCULAR; INTRAVENOUS; SUBCUTANEOUS EVERY 8 HOURS
Qty: 0 | Refills: 0 | Status: DISCONTINUED | OUTPATIENT
Start: 2018-10-18 | End: 2018-10-21

## 2018-10-18 RX ORDER — TRANEXAMIC ACID 100 MG/ML
1000 INJECTION, SOLUTION INTRAVENOUS ONCE
Qty: 0 | Refills: 0 | Status: COMPLETED | OUTPATIENT
Start: 2018-10-18 | End: 2018-10-18

## 2018-10-18 RX ORDER — ONDANSETRON 8 MG/1
4 TABLET, FILM COATED ORAL ONCE
Qty: 0 | Refills: 0 | Status: DISCONTINUED | OUTPATIENT
Start: 2018-10-18 | End: 2018-10-21

## 2018-10-18 RX ORDER — DIBUCAINE 1 %
1 OINTMENT (GRAM) RECTAL EVERY 4 HOURS
Qty: 0 | Refills: 0 | Status: DISCONTINUED | OUTPATIENT
Start: 2018-10-18 | End: 2018-10-21

## 2018-10-18 RX ORDER — OXYTOCIN 10 UNIT/ML
20 VIAL (ML) INJECTION
Qty: 20 | Refills: 0 | Status: DISCONTINUED | OUTPATIENT
Start: 2018-10-18 | End: 2018-10-21

## 2018-10-18 RX ORDER — PROTHROMBIN COMPLEX CONCENTRATE (HUMAN) 25.5; 16.5; 24; 22; 22; 26 [IU]/ML; [IU]/ML; [IU]/ML; [IU]/ML; [IU]/ML; [IU]/ML
1500 POWDER, FOR SOLUTION INTRAVENOUS ONCE
Qty: 0 | Refills: 0 | Status: DISCONTINUED | OUTPATIENT
Start: 2018-10-18 | End: 2018-10-21

## 2018-10-18 RX ORDER — ACETAMINOPHEN 500 MG
1000 TABLET ORAL ONCE
Qty: 0 | Refills: 0 | Status: COMPLETED | OUTPATIENT
Start: 2018-10-18 | End: 2018-10-18

## 2018-10-18 RX ORDER — PRAMOXINE HYDROCHLORIDE 150 MG/15G
1 AEROSOL, FOAM RECTAL EVERY 4 HOURS
Qty: 0 | Refills: 0 | Status: DISCONTINUED | OUTPATIENT
Start: 2018-10-18 | End: 2018-10-21

## 2018-10-18 RX ORDER — SODIUM CHLORIDE 9 MG/ML
2000 INJECTION, SOLUTION INTRAVENOUS ONCE
Qty: 0 | Refills: 0 | Status: DISCONTINUED | OUTPATIENT
Start: 2018-10-18 | End: 2018-10-19

## 2018-10-18 RX ORDER — SODIUM CHLORIDE 9 MG/ML
1000 INJECTION, SOLUTION INTRAVENOUS
Qty: 0 | Refills: 0 | Status: DISCONTINUED | OUTPATIENT
Start: 2018-10-18 | End: 2018-10-19

## 2018-10-18 RX ORDER — OXYTOCIN 10 UNIT/ML
2 VIAL (ML) INJECTION
Qty: 30 | Refills: 0 | Status: DISCONTINUED | OUTPATIENT
Start: 2018-10-18 | End: 2018-10-21

## 2018-10-18 RX ORDER — ERTAPENEM SODIUM 1 G/1
1000 INJECTION, POWDER, LYOPHILIZED, FOR SOLUTION INTRAMUSCULAR; INTRAVENOUS EVERY 24 HOURS
Qty: 0 | Refills: 0 | Status: DISCONTINUED | OUTPATIENT
Start: 2018-10-18 | End: 2018-10-20

## 2018-10-18 RX ORDER — PHYTONADIONE (VIT K1) 5 MG
5 TABLET ORAL ONCE
Qty: 0 | Refills: 0 | Status: DISCONTINUED | OUTPATIENT
Start: 2018-10-18 | End: 2018-10-18

## 2018-10-18 RX ORDER — HYDROCORTISONE 1 %
1 OINTMENT (GRAM) TOPICAL EVERY 4 HOURS
Qty: 0 | Refills: 0 | Status: DISCONTINUED | OUTPATIENT
Start: 2018-10-18 | End: 2018-10-21

## 2018-10-18 RX ORDER — OXYTOCIN 10 UNIT/ML
41.67 VIAL (ML) INJECTION
Qty: 20 | Refills: 0 | Status: DISCONTINUED | OUTPATIENT
Start: 2018-10-18 | End: 2018-10-21

## 2018-10-18 RX ORDER — PHYTONADIONE (VIT K1) 5 MG
5 TABLET ORAL ONCE
Qty: 0 | Refills: 0 | Status: COMPLETED | OUTPATIENT
Start: 2018-10-18 | End: 2018-10-18

## 2018-10-18 RX ADMIN — Medication 1000 MILLIGRAM(S): at 17:09

## 2018-10-18 RX ADMIN — OXYCODONE HYDROCHLORIDE 5 MILLIGRAM(S): 5 TABLET ORAL at 15:28

## 2018-10-18 RX ADMIN — PROTHROMBIN COMPLEX CONCENTRATE (HUMAN) 400 INTERNATIONAL UNIT(S): 25.5; 16.5; 24; 22; 22; 26 POWDER, FOR SOLUTION INTRAVENOUS at 09:37

## 2018-10-18 RX ADMIN — Medication 0.2 MILLIGRAM(S): at 09:14

## 2018-10-18 RX ADMIN — TRANEXAMIC ACID 220 MILLIGRAM(S): 100 INJECTION, SOLUTION INTRAVENOUS at 08:37

## 2018-10-18 RX ADMIN — Medication 60 MILLIUNIT(S)/MIN: at 11:17

## 2018-10-18 RX ADMIN — OXYCODONE HYDROCHLORIDE 5 MILLIGRAM(S): 5 TABLET ORAL at 20:28

## 2018-10-18 RX ADMIN — Medication 400 MILLIGRAM(S): at 19:11

## 2018-10-18 RX ADMIN — Medication 100 MILLIGRAM(S): at 09:28

## 2018-10-18 RX ADMIN — TRANEXAMIC ACID 220 MILLIGRAM(S): 100 INJECTION, SOLUTION INTRAVENOUS at 09:50

## 2018-10-18 RX ADMIN — OXYCODONE HYDROCHLORIDE 5 MILLIGRAM(S): 5 TABLET ORAL at 19:13

## 2018-10-18 RX ADMIN — Medication 0.2 MILLIGRAM(S): at 08:38

## 2018-10-18 RX ADMIN — Medication 5 MILLIGRAM(S): at 09:37

## 2018-10-18 RX ADMIN — Medication 0.2 MILLIGRAM(S): at 08:08

## 2018-10-18 RX ADMIN — Medication 400 MILLIGRAM(S): at 12:39

## 2018-10-18 RX ADMIN — Medication 1000 MILLIGRAM(S): at 20:29

## 2018-10-18 RX ADMIN — Medication 0.2 MILLIGRAM(S): at 13:51

## 2018-10-18 RX ADMIN — Medication 0.2 MILLIGRAM(S): at 17:49

## 2018-10-18 RX ADMIN — OXYCODONE HYDROCHLORIDE 5 MILLIGRAM(S): 5 TABLET ORAL at 12:18

## 2018-10-18 RX ADMIN — OXYCODONE HYDROCHLORIDE 5 MILLIGRAM(S): 5 TABLET ORAL at 16:05

## 2018-10-18 RX ADMIN — Medication 0.2 MILLIGRAM(S): at 22:55

## 2018-10-18 RX ADMIN — Medication 2 MILLIUNIT(S)/MIN: at 00:51

## 2018-10-18 RX ADMIN — SODIUM CHLORIDE 3 MILLILITER(S): 9 INJECTION INTRAMUSCULAR; INTRAVENOUS; SUBCUTANEOUS at 21:00

## 2018-10-18 RX ADMIN — ERTAPENEM SODIUM 120 MILLIGRAM(S): 1 INJECTION, POWDER, LYOPHILIZED, FOR SOLUTION INTRAMUSCULAR; INTRAVENOUS at 12:14

## 2018-10-18 NOTE — PROVIDER CONTACT NOTE (OTHER) - ACTION/TREATMENT ORDERED:
bedside sono done,oxycodone 5 mg and iv tylenol given,  pt to receive a-line by dr morin and dr burton

## 2018-10-18 NOTE — PROVIDER CONTACT NOTE (OTHER) - SITUATION
pt c/o back pain and rectal pain vaginal bleeding noted thru vaginal packing small amount.  Letitia Springer crna and dr morin unable to obtain blood cultures

## 2018-10-18 NOTE — PROVIDER CONTACT NOTE (OTHER) - ACTION/TREATMENT ORDERED:
oxycodone to be given for pain and pt to be evaluated. dr haley to be notified in regards to blood cultures

## 2018-10-18 NOTE — PROCEDURE NOTE - ADDITIONAL PROCEDURE DETAILS
20cc blood drawn for blood cultures after drawing back a 5cc waste syringe. Patient and PACU nursing staff instructed to keep tape dry.

## 2018-10-18 NOTE — PROCEDURE NOTE - NSPOSTCAREGUIDE_GEN_A_CORE
Keep the cast/splint/dressing clean and dry/Verbal/written post procedure instructions were given to patient/caregiver/Instructed patient/caregiver regarding signs and symptoms of infection

## 2018-10-18 NOTE — CHART NOTE - NSCHARTNOTEFT_GEN_A_CORE
Patient is 32yo Religious  status post  complicated by PPH. Pt taken to OR for PPH with Drs Ramon and Brown where vaginal lacerations were repaired and vaginal packing placed x2. Pt taken to recovery room postop.     Called to bedside by RN for report of pt bleeding through vaginal packing and complaining of rectal pain. Pt seen and evaluated with Dr. Gardner at bedside. Packing noted to be saturated but exam revealed no evidence of rectal hematoma. Uterus noted to be firm. Bedside sono revealed uterus without evidence of retained placenta or bleeding. No active bleeding.  Pt tachycardic to 110s with BP 140s/80s with adequate UOP. Anesthesia at bedside, unable to draw labs for blood cultures; plan to place A-line to obtain labs.     Vital Signs Last 24 Hrs  T(C): 38.6 (18 Oct 2018 10:50), Max: 38.6 (18 Oct 2018 10:50)  T(F): 101.5 (18 Oct 2018 10:50), Max: 101.5 (18 Oct 2018 10:50)  HR: 118 (18 Oct 2018 12:20) (102 - 130)  BP: 119/70 (18 Oct 2018 12:20) (116/72 - 128/73)  BP(mean): 89 (18 Oct 2018 12:20) (86 - 93)  RR: 16 (18 Oct 2018 12:20) (16 - 24)  SpO2: 100% (18 Oct 2018 12:20) (98% - 100%)    I&O's Detail    17 Oct 2018 07:01  -  18 Oct 2018 07:00  --------------------------------------------------------  IN:    dextrose 5% + sodium chloride 0.9%.: 2716 mL    sodium chloride 0.9%.: 2900 mL  Total IN: 5616 mL    OUT:    Indwelling Catheter - Urethral: 1000 mL    Voided: 1100 mL  Total OUT: 2100 mL    Total NET: 3516 mL      18 Oct 2018 07:01  -  18 Oct 2018 12:46  --------------------------------------------------------  IN:    IV PiggyBack: 50 mL    Lactated Ringers IV Bolus: 1000 mL    oxytocin Infusion: 1000 mL    Sodium Chloride 0.9% IV Bolus: 1000 mL  Total IN: 3050 mL    OUT:    Estimated Blood Loss: 1500 mL    Indwelling Catheter - Urethral: 1350 mL  Total OUT: 2850 mL    Total NET: 200 mL      Labs:                        11.8   13.3  )-----------( 191      ( 18 Oct 2018 12:15 )             35.0                         11.9   13.6  )-----------( 200      ( 18 Oct 2018 09:31 )             35.3                         14.4   5.2   )-----------( 232      ( 17 Oct 2018 01:19 )             42.3       PT/INR - ( 18 Oct 2018 12:14 )   PT: 10.7 sec;   INR: 0.98 ratio         PTT - ( 18 Oct 2018 12:14 )  PTT:30.1 sec    Fibrinogen:     Lactate: Lactate, Blood: 3.0 mmol/L (10-18 @ 12:18)  Lactate, Blood: 3.7 mmol/L (10-18 @ 09:31)      MEDICATIONS  (STANDING):  acetaminophen   Tablet .. 975 milliGRAM(s) Oral every 6 hours  acetaminophen  IVPB .. 1000 milliGRAM(s) IV Intermittent once  dextrose 5% + sodium chloride 0.9%. 1000 milliLiter(s) (125 mL/Hr) IV Continuous <Continuous>  ertapenem  IVPB 1000 milliGRAM(s) IV Intermittent every 24 hours  influenza   Vaccine 0.5 milliLiter(s) IntraMuscular once  lactated ringers Bolus 2000 milliLiter(s) IV Bolus once  lactated ringers. 1000 milliLiter(s) (125 mL/Hr) IV Continuous <Continuous>  methylergonovine 0.2 milliGRAM(s) Oral every 4 hours  misoprostol Oral Solution 60 MICROGram(s) Oral every 2 hours  ondansetron Injectable 4 milliGRAM(s) IV Push once  ondansetron Injectable 4 milliGRAM(s) IV Push once  oxyCODONE    IR 5 milliGRAM(s) Oral every 3 hours  oxytocin Infusion 20 milliUNIT(s)/Min (60 mL/Hr) IV Continuous <Continuous>  oxytocin Infusion 2 milliUNIT(s)/Min (2 mL/Hr) IV Continuous <Continuous>  oxytocin Infusion 41.667 milliUNIT(s)/Min (125 mL/Hr) IV Continuous <Continuous>  phytonadione  IVPB 5 milliGRAM(s) IV Intermittent once  prothrombin complex concentrate IVPB (KCENTRA) 1500 International Unit(s) IV Intermittent once  sodium chloride 0.9% Bolus 500 milliLiter(s) IV Bolus once  sodium chloride 0.9% lock flush 3 milliLiter(s) IV Push every 8 hours  sodium chloride 0.9%. 1000 milliLiter(s) (125 mL/Hr) IV Continuous <Continuous>      Assessment/Plan:   32yo Religious status post  complicated by PPH status post vaginal laceration repair, TXA, and metherginex3.   -Pt evaluated at bedside for complaint of rectal pain and bleeding through packing. Exam without evidence of rectal hematoma. 12p labs stable, hct 35.3->35.0. follow up 4pm labs.  -Pt hemodynamically stable at this time given stable hct, BPs, and adquate UOP.  -Tachycardia likely 2/2 infection given pt febrile to 38.6 vs pain (less likely as primary cause of sustained tachycardia). Blood cultures to be obtained. C/w IV abx. Trend WBCs -stable 13.6->13.0, currently will continue to trend with repeat 4pm labs.   -IV Tylenol and oxycodone for pain prn       Pt seen and d/w Dr. Kendra Bishop   -------------------------------------------------------------------------------------------------------------

## 2018-10-18 NOTE — PROCEDURE NOTE - NSPROCDETAILS_GEN_ALL_CORE
positive blood return obtained via catheter/connected to a pressurized flush line/location identified, draped/prepped, sterile technique used, needle inserted/introduced/all materials/supplies accounted for at end of procedure/hemostasis with direct pressure, dressing applied/ultrasound guidance/modified Seldinger technique

## 2018-10-19 LAB
BASOPHILS # BLD AUTO: 0 K/UL — SIGNIFICANT CHANGE UP (ref 0–0.2)
BASOPHILS NFR BLD AUTO: 0.3 % — SIGNIFICANT CHANGE UP (ref 0–2)
CULTURE RESULTS: NO GROWTH — SIGNIFICANT CHANGE UP
EOSINOPHIL # BLD AUTO: 0.1 K/UL — SIGNIFICANT CHANGE UP (ref 0–0.5)
EOSINOPHIL NFR BLD AUTO: 0.8 % — SIGNIFICANT CHANGE UP (ref 0–6)
HCT VFR BLD CALC: 27.7 % — LOW (ref 34.5–45)
HGB BLD-MCNC: 8.9 G/DL — LOW (ref 11.5–15.5)
LYMPHOCYTES # BLD AUTO: 16.7 % — SIGNIFICANT CHANGE UP (ref 13–44)
LYMPHOCYTES # BLD AUTO: 2 K/UL — SIGNIFICANT CHANGE UP (ref 1–3.3)
MCHC RBC-ENTMCNC: 29.3 PG — SIGNIFICANT CHANGE UP (ref 27–34)
MCHC RBC-ENTMCNC: 32.3 GM/DL — SIGNIFICANT CHANGE UP (ref 32–36)
MCV RBC AUTO: 91 FL — SIGNIFICANT CHANGE UP (ref 80–100)
MONOCYTES # BLD AUTO: 1.1 K/UL — HIGH (ref 0–0.9)
MONOCYTES NFR BLD AUTO: 9.3 % — SIGNIFICANT CHANGE UP (ref 2–14)
NEUTROPHILS # BLD AUTO: 8.9 K/UL — HIGH (ref 1.8–7.4)
NEUTROPHILS NFR BLD AUTO: 72.8 % — SIGNIFICANT CHANGE UP (ref 43–77)
PLATELET # BLD AUTO: 171 K/UL — SIGNIFICANT CHANGE UP (ref 150–400)
RBC # BLD: 3.05 M/UL — LOW (ref 3.8–5.2)
RBC # FLD: 13.4 % — SIGNIFICANT CHANGE UP (ref 10.3–14.5)
SPECIMEN SOURCE: SIGNIFICANT CHANGE UP
WBC # BLD: 12.2 K/UL — HIGH (ref 3.8–10.5)
WBC # FLD AUTO: 12.2 K/UL — HIGH (ref 3.8–10.5)

## 2018-10-19 RX ORDER — ACETAMINOPHEN 500 MG
975 TABLET ORAL EVERY 6 HOURS
Qty: 0 | Refills: 0 | Status: DISCONTINUED | OUTPATIENT
Start: 2018-10-19 | End: 2018-10-21

## 2018-10-19 RX ORDER — ACETAMINOPHEN 500 MG
1000 TABLET ORAL ONCE
Qty: 0 | Refills: 0 | Status: COMPLETED | OUTPATIENT
Start: 2018-10-19 | End: 2018-10-19

## 2018-10-19 RX ORDER — SODIUM CHLORIDE 9 MG/ML
1000 INJECTION, SOLUTION INTRAVENOUS
Qty: 0 | Refills: 0 | Status: DISCONTINUED | OUTPATIENT
Start: 2018-10-19 | End: 2018-10-21

## 2018-10-19 RX ADMIN — Medication 1000 MILLIGRAM(S): at 01:30

## 2018-10-19 RX ADMIN — Medication 975 MILLIGRAM(S): at 20:00

## 2018-10-19 RX ADMIN — ERTAPENEM SODIUM 120 MILLIGRAM(S): 1 INJECTION, POWDER, LYOPHILIZED, FOR SOLUTION INTRAMUSCULAR; INTRAVENOUS at 11:49

## 2018-10-19 RX ADMIN — Medication 975 MILLIGRAM(S): at 11:49

## 2018-10-19 RX ADMIN — OXYCODONE HYDROCHLORIDE 5 MILLIGRAM(S): 5 TABLET ORAL at 00:30

## 2018-10-19 RX ADMIN — OXYCODONE HYDROCHLORIDE 5 MILLIGRAM(S): 5 TABLET ORAL at 15:38

## 2018-10-19 RX ADMIN — OXYCODONE HYDROCHLORIDE 5 MILLIGRAM(S): 5 TABLET ORAL at 16:00

## 2018-10-19 RX ADMIN — OXYCODONE HYDROCHLORIDE 5 MILLIGRAM(S): 5 TABLET ORAL at 06:08

## 2018-10-19 RX ADMIN — OXYCODONE HYDROCHLORIDE 5 MILLIGRAM(S): 5 TABLET ORAL at 01:30

## 2018-10-19 RX ADMIN — OXYCODONE HYDROCHLORIDE 5 MILLIGRAM(S): 5 TABLET ORAL at 09:25

## 2018-10-19 RX ADMIN — Medication 975 MILLIGRAM(S): at 13:00

## 2018-10-19 RX ADMIN — Medication 1000 MILLIGRAM(S): at 08:30

## 2018-10-19 RX ADMIN — Medication 400 MILLIGRAM(S): at 06:04

## 2018-10-19 RX ADMIN — Medication 400 MILLIGRAM(S): at 00:30

## 2018-10-19 RX ADMIN — OXYCODONE HYDROCHLORIDE 5 MILLIGRAM(S): 5 TABLET ORAL at 08:30

## 2018-10-19 RX ADMIN — Medication 975 MILLIGRAM(S): at 19:40

## 2018-10-19 RX ADMIN — SODIUM CHLORIDE 3 MILLILITER(S): 9 INJECTION INTRAMUSCULAR; INTRAVENOUS; SUBCUTANEOUS at 14:14

## 2018-10-19 RX ADMIN — OXYCODONE HYDROCHLORIDE 5 MILLIGRAM(S): 5 TABLET ORAL at 10:00

## 2018-10-19 NOTE — PROGRESS NOTE ADULT - PROBLEM SELECTOR PLAN 1
- Continue with po analgesia  - Increase ambulation  - Continue regular diet  -f/u AM CBC  -maintain lopez until am cbc  -c/w Invanz for PPT, likely endometritis    Holli, PGY-3

## 2018-10-19 NOTE — PROGRESS NOTE ADULT - SUBJECTIVE AND OBJECTIVE BOX
R3 Postpartum Note    Patient seen and examined at bedside, no acute overnight events. No acute complaints, pain well controlled.  Patient is ambulating, voiding spontaneously, passing gas, and tolerating regular diet. Denies CP, sob, light headedness, palpitations    Vital Signs Last 24 Hours  T(C): 37.1 (10-19-18 @ 00:42), Max: 38.6 (10-18-18 @ 10:50)  HR: 79 (10-19-18 @ 00:42) (79 - 130)  BP: 107/68 (10-19-18 @ 00:42) (107/56 - 130/74)  RR: 18 (10-19-18 @ 00:42) (13 - 46)  SpO2: 100% (10-19-18 @ 00:42) (74% - 100%)    Physical Exam:  General: NAD  Abdomen: Soft, non-tender, non-distended, fundus firm  Pelvic: Lochia wnl    Labs:    Blood Type: O Positive  Antibody Screen: --  RPR: Negative               10.5   13.6  )-----------( 176      ( 10-18 @ 16:39 )             30.0                11.8   13.3  )-----------( 191      ( 10-18 @ 12:15 )             35.0                11.9   13.6  )-----------( 200      ( 10-18 @ 09:31 )             35.3                14.4   5.2   )-----------( 232      ( 10-17 @ 01:19 )             42.3         MEDICATIONS  (STANDING):  acetaminophen   Tablet .. 975 milliGRAM(s) Oral every 6 hours  ertapenem  IVPB 1000 milliGRAM(s) IV Intermittent every 24 hours  influenza   Vaccine 0.5 milliLiter(s) IntraMuscular once  lactated ringers. 1000 milliLiter(s) (125 mL/Hr) IV Continuous <Continuous>  misoprostol Oral Solution 60 MICROGram(s) Oral every 2 hours  ondansetron Injectable 4 milliGRAM(s) IV Push once  ondansetron Injectable 4 milliGRAM(s) IV Push once  oxyCODONE    IR 5 milliGRAM(s) Oral every 3 hours  oxytocin Infusion 20 milliUNIT(s)/Min (60 mL/Hr) IV Continuous <Continuous>  oxytocin Infusion 2 milliUNIT(s)/Min (2 mL/Hr) IV Continuous <Continuous>  oxytocin Infusion 41.667 milliUNIT(s)/Min (125 mL/Hr) IV Continuous <Continuous>  prothrombin complex concentrate IVPB (KCENTRA) 1500 International Unit(s) IV Intermittent once  sodium chloride 0.9% lock flush 3 milliLiter(s) IV Push every 8 hours    MEDICATIONS  (PRN):  dibucaine 1% Ointment 1 Application(s) Topical every 4 hours PRN Tenderness of the episiotomy site  hydrocortisone 1% Cream 1 Application(s) Topical every 4 hours PRN Moderate to Severe Perineal Pain  oxyCODONE    IR 5 milliGRAM(s) Oral every 4 hours PRN Severe Pain (7 -10)  pramoxine 1%/zinc 5% Cream 1 Application(s) Topical every 4 hours PRN Moderate to Severe Perineal Pain  witch hazel Pads 1 Application(s) Topical every 4 hours PRN Perineal discomfort R3 Postpartum Note    Patient seen and examined at bedside, no acute overnight events. No acute complaints, pain well controlled.  Patient is ambulating, lopez in place, and tolerating regular diet. Denies CP, sob, light headedness, palpitations    Vital Signs Last 24 Hours  T(C): 37.1 (10-19-18 @ 00:42), Max: 38.6 (10-18-18 @ 10:50)  HR: 79 (10-19-18 @ 00:42) (79 - 130)  BP: 107/68 (10-19-18 @ 00:42) (107/56 - 130/74)  RR: 18 (10-19-18 @ 00:42) (13 - 46)  SpO2: 100% (10-19-18 @ 00:42) (74% - 100%)    Physical Exam:  General: NAD  Abdomen: Soft, non-tender, non-distended, fundus firm  Pelvic: Lochia wnl    Labs:    Blood Type: O Positive  Antibody Screen: --  RPR: Negative               10.5   13.6  )-----------( 176      ( 10-18 @ 16:39 )             30.0                11.8   13.3  )-----------( 191      ( 10-18 @ 12:15 )             35.0                11.9   13.6  )-----------( 200      ( 10-18 @ 09:31 )             35.3                14.4   5.2   )-----------( 232      ( 10-17 @ 01:19 )             42.3         MEDICATIONS  (STANDING):  acetaminophen   Tablet .. 975 milliGRAM(s) Oral every 6 hours  ertapenem  IVPB 1000 milliGRAM(s) IV Intermittent every 24 hours  influenza   Vaccine 0.5 milliLiter(s) IntraMuscular once  lactated ringers. 1000 milliLiter(s) (125 mL/Hr) IV Continuous <Continuous>  misoprostol Oral Solution 60 MICROGram(s) Oral every 2 hours  ondansetron Injectable 4 milliGRAM(s) IV Push once  ondansetron Injectable 4 milliGRAM(s) IV Push once  oxyCODONE    IR 5 milliGRAM(s) Oral every 3 hours  oxytocin Infusion 20 milliUNIT(s)/Min (60 mL/Hr) IV Continuous <Continuous>  oxytocin Infusion 2 milliUNIT(s)/Min (2 mL/Hr) IV Continuous <Continuous>  oxytocin Infusion 41.667 milliUNIT(s)/Min (125 mL/Hr) IV Continuous <Continuous>  prothrombin complex concentrate IVPB (KCENTRA) 1500 International Unit(s) IV Intermittent once  sodium chloride 0.9% lock flush 3 milliLiter(s) IV Push every 8 hours    MEDICATIONS  (PRN):  dibucaine 1% Ointment 1 Application(s) Topical every 4 hours PRN Tenderness of the episiotomy site  hydrocortisone 1% Cream 1 Application(s) Topical every 4 hours PRN Moderate to Severe Perineal Pain  oxyCODONE    IR 5 milliGRAM(s) Oral every 4 hours PRN Severe Pain (7 -10)  pramoxine 1%/zinc 5% Cream 1 Application(s) Topical every 4 hours PRN Moderate to Severe Perineal Pain  witch hazel Pads 1 Application(s) Topical every 4 hours PRN Perineal discomfort

## 2018-10-20 LAB
BASOPHILS # BLD AUTO: 0 K/UL — SIGNIFICANT CHANGE UP (ref 0–0.2)
BASOPHILS NFR BLD AUTO: 0.1 % — SIGNIFICANT CHANGE UP (ref 0–2)
EOSINOPHIL # BLD AUTO: 0.1 K/UL — SIGNIFICANT CHANGE UP (ref 0–0.5)
EOSINOPHIL NFR BLD AUTO: 1.4 % — SIGNIFICANT CHANGE UP (ref 0–6)
HCT VFR BLD CALC: 26.5 % — LOW (ref 34.5–45)
HGB BLD-MCNC: 9 G/DL — LOW (ref 11.5–15.5)
LYMPHOCYTES # BLD AUTO: 2.8 K/UL — SIGNIFICANT CHANGE UP (ref 1–3.3)
LYMPHOCYTES # BLD AUTO: 30.5 % — SIGNIFICANT CHANGE UP (ref 13–44)
MCHC RBC-ENTMCNC: 31.1 PG — SIGNIFICANT CHANGE UP (ref 27–34)
MCHC RBC-ENTMCNC: 34.1 GM/DL — SIGNIFICANT CHANGE UP (ref 32–36)
MCV RBC AUTO: 91.2 FL — SIGNIFICANT CHANGE UP (ref 80–100)
MONOCYTES # BLD AUTO: 0.6 K/UL — SIGNIFICANT CHANGE UP (ref 0–0.9)
MONOCYTES NFR BLD AUTO: 6.7 % — SIGNIFICANT CHANGE UP (ref 2–14)
NEUTROPHILS # BLD AUTO: 5.7 K/UL — SIGNIFICANT CHANGE UP (ref 1.8–7.4)
NEUTROPHILS NFR BLD AUTO: 61.3 % — SIGNIFICANT CHANGE UP (ref 43–77)
PLATELET # BLD AUTO: 179 K/UL — SIGNIFICANT CHANGE UP (ref 150–400)
RBC # BLD: 2.9 M/UL — LOW (ref 3.8–5.2)
RBC # FLD: 13.2 % — SIGNIFICANT CHANGE UP (ref 10.3–14.5)
WBC # BLD: 9.3 K/UL — SIGNIFICANT CHANGE UP (ref 3.8–10.5)
WBC # FLD AUTO: 9.3 K/UL — SIGNIFICANT CHANGE UP (ref 3.8–10.5)

## 2018-10-20 PROCEDURE — 93970 EXTREMITY STUDY: CPT | Mod: 26

## 2018-10-20 RX ORDER — DOCUSATE SODIUM 100 MG
100 CAPSULE ORAL THREE TIMES A DAY
Qty: 0 | Refills: 0 | Status: DISCONTINUED | OUTPATIENT
Start: 2018-10-20 | End: 2018-10-21

## 2018-10-20 RX ADMIN — SODIUM CHLORIDE 3 MILLILITER(S): 9 INJECTION INTRAMUSCULAR; INTRAVENOUS; SUBCUTANEOUS at 14:45

## 2018-10-20 RX ADMIN — OXYCODONE HYDROCHLORIDE 5 MILLIGRAM(S): 5 TABLET ORAL at 12:05

## 2018-10-20 RX ADMIN — Medication 975 MILLIGRAM(S): at 18:07

## 2018-10-20 RX ADMIN — Medication 975 MILLIGRAM(S): at 07:52

## 2018-10-20 RX ADMIN — Medication 975 MILLIGRAM(S): at 18:55

## 2018-10-20 RX ADMIN — OXYCODONE HYDROCHLORIDE 5 MILLIGRAM(S): 5 TABLET ORAL at 13:45

## 2018-10-20 RX ADMIN — Medication 975 MILLIGRAM(S): at 12:08

## 2018-10-20 RX ADMIN — Medication 975 MILLIGRAM(S): at 12:05

## 2018-10-20 RX ADMIN — Medication 975 MILLIGRAM(S): at 05:24

## 2018-10-20 NOTE — DIETITIAN INITIAL EVALUATION ADULT. - OTHER INFO
Pt seen for consult. Pt S/P , complicated by PPH, at 40.4 weeks, Pt c gestational DM. Pt c allergy to peanuts. Was taking pre- multivitamin at home. Pt seen for consult. Pt S/P , complicated by PPH, at 40.4 weeks, Pt c gestational DM. Pt c allergy to peanuts (rash, itchiness is the reaction). Was taking pre- multivitamin at home. Pt plans on breastfeeding.

## 2018-10-20 NOTE — DIETITIAN INITIAL EVALUATION ADULT. - NS AS NUTRI INTERV ED CONTENT
Patient educated on postpartum dietary recommendations, including risk of development of T2DM and reinforced importance of DM screening 4-12 weeks postpartum. Encouraged balanced, healthy meals, physical activity as permitted by MD & encouraged adequate hydration and taking prenatal multivitamin supplements.

## 2018-10-20 NOTE — DIETITIAN INITIAL EVALUATION ADULT. - ADHERENCE
GDM meal pattern, was on Metformin GDM meal pattern, was on Metformin, reports Finger sticks were within goal during pregnancy

## 2018-10-20 NOTE — PROGRESS NOTE ADULT - SUBJECTIVE AND OBJECTIVE BOX
R3 Postpartum Note    Patient seen and examined at bedside, no acute overnight events. No acute complaints, pain well controlled.  Patient is ambulating, voiding spontaneously, passing gas, and tolerating regular diet. Denies light headedness, SOB, CP, palpitations. Denies fevers, chills    Vital Signs Last 24 Hours  T(C): 36.7 (10-19-18 @ 21:00), Max: 37.2 (10-19-18 @ 18:20)  HR: 88 (10-19-18 @ 21:00) (74 - 116)  BP: 92/60 (10-19-18 @ 21:00) (92/60 - 116/71)  RR: 18 (10-19-18 @ 21:00) (18 - 18)  SpO2: 100% (10-19-18 @ 21:00) (99% - 100%)    Physical Exam:  General: NAD  Abdomen: Soft, non-tender, non-distended, fundus firm  Pelvic: Lochia wnl    Labs:    Blood Type: O Positive  Antibody Screen: --  RPR: Negative               8.9    12.2  )-----------( 171      ( 10-19 @ 07:17 )             27.7                10.5   13.6  )-----------( 176      ( 10-18 @ 16:39 )             30.0                11.8   13.3  )-----------( 191      ( 10-18 @ 12:15 )             35.0                11.9   13.6  )-----------( 200      ( 10-18 @ 09:31 )             35.3         MEDICATIONS  (STANDING):  acetaminophen   Tablet .. 975 milliGRAM(s) Oral every 6 hours  ertapenem  IVPB 1000 milliGRAM(s) IV Intermittent every 24 hours  influenza   Vaccine 0.5 milliLiter(s) IntraMuscular once  lactated ringers. 1000 milliLiter(s) (125 mL/Hr) IV Continuous <Continuous>  misoprostol Oral Solution 60 MICROGram(s) Oral every 2 hours  ondansetron Injectable 4 milliGRAM(s) IV Push once  ondansetron Injectable 4 milliGRAM(s) IV Push once  oxyCODONE    IR 5 milliGRAM(s) Oral every 3 hours  oxytocin Infusion 20 milliUNIT(s)/Min (60 mL/Hr) IV Continuous <Continuous>  oxytocin Infusion 2 milliUNIT(s)/Min (2 mL/Hr) IV Continuous <Continuous>  oxytocin Infusion 41.667 milliUNIT(s)/Min (125 mL/Hr) IV Continuous <Continuous>  prothrombin complex concentrate IVPB (KCENTRA) 1500 International Unit(s) IV Intermittent once  sodium chloride 0.9% lock flush 3 milliLiter(s) IV Push every 8 hours    MEDICATIONS  (PRN):  dibucaine 1% Ointment 1 Application(s) Topical every 4 hours PRN Tenderness of the episiotomy site  hydrocortisone 1% Cream 1 Application(s) Topical every 4 hours PRN Moderate to Severe Perineal Pain  oxyCODONE    IR 5 milliGRAM(s) Oral every 4 hours PRN Severe Pain (7 -10)  pramoxine 1%/zinc 5% Cream 1 Application(s) Topical every 4 hours PRN Moderate to Severe Perineal Pain  witch hazel Pads 1 Application(s) Topical every 4 hours PRN Perineal discomfort R3 Postpartum Note    Patient seen and examined at bedside, no acute overnight events. No acute complaints, pain well controlled.  Patient is ambulating, voiding spontaneously, passing gas, and tolerating regular diet. Denies light headedness, SOB, CP, palpitations. Notes chills overnight, denies fevers. Notes left sided calf pain.    Vital Signs Last 24 Hours  T(C): 36.7 (10-19-18 @ 21:00), Max: 37.2 (10-19-18 @ 18:20)  HR: 88 (10-19-18 @ 21:00) (74 - 116)  BP: 92/60 (10-19-18 @ 21:00) (92/60 - 116/71)  RR: 18 (10-19-18 @ 21:00) (18 - 18)  SpO2: 100% (10-19-18 @ 21:00) (99% - 100%)    Physical Exam:  General: NAD  Abdomen: Soft, non-tender, non-distended, fundus firm  Pelvic: Lochia wnl  Ext: L calf +tenderness, +homans    Labs:    Blood Type: O Positive  Antibody Screen: --  RPR: Negative               8.9    12.2  )-----------( 171      ( 10-19 @ 07:17 )             27.7                10.5   13.6  )-----------( 176      ( 10-18 @ 16:39 )             30.0                11.8   13.3  )-----------( 191      ( 10-18 @ 12:15 )             35.0                11.9   13.6  )-----------( 200      ( 10-18 @ 09:31 )             35.3         MEDICATIONS  (STANDING):  acetaminophen   Tablet .. 975 milliGRAM(s) Oral every 6 hours  ertapenem  IVPB 1000 milliGRAM(s) IV Intermittent every 24 hours  influenza   Vaccine 0.5 milliLiter(s) IntraMuscular once  lactated ringers. 1000 milliLiter(s) (125 mL/Hr) IV Continuous <Continuous>  misoprostol Oral Solution 60 MICROGram(s) Oral every 2 hours  ondansetron Injectable 4 milliGRAM(s) IV Push once  ondansetron Injectable 4 milliGRAM(s) IV Push once  oxyCODONE    IR 5 milliGRAM(s) Oral every 3 hours  oxytocin Infusion 20 milliUNIT(s)/Min (60 mL/Hr) IV Continuous <Continuous>  oxytocin Infusion 2 milliUNIT(s)/Min (2 mL/Hr) IV Continuous <Continuous>  oxytocin Infusion 41.667 milliUNIT(s)/Min (125 mL/Hr) IV Continuous <Continuous>  prothrombin complex concentrate IVPB (KCENTRA) 1500 International Unit(s) IV Intermittent once  sodium chloride 0.9% lock flush 3 milliLiter(s) IV Push every 8 hours    MEDICATIONS  (PRN):  dibucaine 1% Ointment 1 Application(s) Topical every 4 hours PRN Tenderness of the episiotomy site  hydrocortisone 1% Cream 1 Application(s) Topical every 4 hours PRN Moderate to Severe Perineal Pain  oxyCODONE    IR 5 milliGRAM(s) Oral every 4 hours PRN Severe Pain (7 -10)  pramoxine 1%/zinc 5% Cream 1 Application(s) Topical every 4 hours PRN Moderate to Severe Perineal Pain  witch hazel Pads 1 Application(s) Topical every 4 hours PRN Perineal discomfort

## 2018-10-20 NOTE — PROGRESS NOTE ADULT - PROBLEM SELECTOR PLAN 1
- Continue with po analgesia  - Increase ambulation  - Continue regular diet  -f/u AM CBC  -consider d/c Invanz, afebrile 10/18    Holli, PGY-3 - Continue with po analgesia  - Increase ambulation  - Continue regular diet  -f/u AM CBC  -consider d/c Invanz, afebrile 10/18  -for lower extremity dopplers to r/o DVT    Holli, PGY-3

## 2018-10-20 NOTE — CHART NOTE - NSCHARTNOTEFT_GEN_A_CORE
Patient seen at bedside. States she is not up to going home today - still feels tired, and needs assistance with ambulation.  No heavy bleeding.     Vital Signs Last 24 Hrs  T(C): 36.7 (20 Oct 2018 13:15), Max: 37.2 (19 Oct 2018 18:20)  T(F): 98.1 (20 Oct 2018 13:15), Max: 98.9 (19 Oct 2018 18:20)  HR: 98 (20 Oct 2018 13:15) (88 - 116)  BP: 96/67 (20 Oct 2018 13:15) (92/60 - 102/70)  RR: 18 (20 Oct 2018 13:15) (18 - 18)  SpO2: 100% (20 Oct 2018 05:18) (100% - 100%)    LE Dopplers negative, vitals reassuring. Patient recovering appropriately. Will f/u anemia  - CBC now  - Iron supplement TID    D/w Dr. Holder  S Mata, R3

## 2018-10-21 ENCOUNTER — TRANSCRIPTION ENCOUNTER (OUTPATIENT)
Age: 33
End: 2018-10-21

## 2018-10-21 VITALS
DIASTOLIC BLOOD PRESSURE: 70 MMHG | HEART RATE: 78 BPM | RESPIRATION RATE: 18 BRPM | TEMPERATURE: 98 F | SYSTOLIC BLOOD PRESSURE: 102 MMHG | OXYGEN SATURATION: 98 %

## 2018-10-21 PROCEDURE — 86780 TREPONEMA PALLIDUM: CPT

## 2018-10-21 PROCEDURE — 86850 RBC ANTIBODY SCREEN: CPT

## 2018-10-21 PROCEDURE — 87086 URINE CULTURE/COLONY COUNT: CPT

## 2018-10-21 PROCEDURE — 93970 EXTREMITY STUDY: CPT

## 2018-10-21 PROCEDURE — 83605 ASSAY OF LACTIC ACID: CPT

## 2018-10-21 PROCEDURE — 36430 TRANSFUSION BLD/BLD COMPNT: CPT

## 2018-10-21 PROCEDURE — P9012: CPT

## 2018-10-21 PROCEDURE — 85610 PROTHROMBIN TIME: CPT

## 2018-10-21 PROCEDURE — 86901 BLOOD TYPING SEROLOGIC RH(D): CPT

## 2018-10-21 PROCEDURE — 82962 GLUCOSE BLOOD TEST: CPT

## 2018-10-21 PROCEDURE — 87040 BLOOD CULTURE FOR BACTERIA: CPT

## 2018-10-21 PROCEDURE — 85730 THROMBOPLASTIN TIME PARTIAL: CPT

## 2018-10-21 PROCEDURE — 80053 COMPREHEN METABOLIC PANEL: CPT

## 2018-10-21 PROCEDURE — 86900 BLOOD TYPING SEROLOGIC ABO: CPT

## 2018-10-21 PROCEDURE — 85027 COMPLETE CBC AUTOMATED: CPT

## 2018-10-21 PROCEDURE — 59050 FETAL MONITOR W/REPORT: CPT

## 2018-10-21 PROCEDURE — 59025 FETAL NON-STRESS TEST: CPT

## 2018-10-21 RX ORDER — ACETAMINOPHEN 500 MG
3 TABLET ORAL
Qty: 0 | Refills: 0 | DISCHARGE
Start: 2018-10-21

## 2018-10-21 RX ORDER — METFORMIN HYDROCHLORIDE 850 MG/1
1 TABLET ORAL
Qty: 0 | Refills: 0 | COMMUNITY

## 2018-10-21 RX ADMIN — SODIUM CHLORIDE 3 MILLILITER(S): 9 INJECTION INTRAMUSCULAR; INTRAVENOUS; SUBCUTANEOUS at 06:30

## 2018-10-21 RX ADMIN — Medication 975 MILLIGRAM(S): at 08:06

## 2018-10-21 RX ADMIN — Medication 975 MILLIGRAM(S): at 00:05

## 2018-10-21 RX ADMIN — Medication 975 MILLIGRAM(S): at 01:23

## 2018-10-21 RX ADMIN — Medication 100 MILLIGRAM(S): at 00:05

## 2018-10-21 RX ADMIN — Medication 100 MILLIGRAM(S): at 06:18

## 2018-10-21 RX ADMIN — Medication 975 MILLIGRAM(S): at 06:17

## 2018-10-21 NOTE — DISCHARGE NOTE OB - HOSPITAL COURSE
The patient had an . Complicated by a post partum hemorrhage.  Her postpartum course was uncomplicated.  She met all post delivery milestones and was discharged in stable condition with instructions on follow up.

## 2018-10-21 NOTE — PROGRESS NOTE ADULT - PROBLEM SELECTOR PLAN 1
- Continue with po analgesia  - Increase ambulation  - Continue regular diet    Dawit Martin, PGY-3  Pager #[phone] ([facility]), [phone] ([place])

## 2018-10-21 NOTE — DISCHARGE NOTE OB - CARE PLAN
Principal Discharge DX:	Vaginal delivery  Goal:	Return to normal activity  Assessment and plan of treatment:	regular diet; activity as tolerated; Appt 6 weeks- please call

## 2018-10-21 NOTE — PROGRESS NOTE ADULT - SUBJECTIVE AND OBJECTIVE BOX
Patient seen and examined at bedside, no acute overnight events. No acute complaints, pain well controlled. Patient is ambulating, voiding spontaneously, passing gas, and tolerating regular diet.    Vital Signs Last 24 Hours  T(C): 36.7 (10-20-18 @ 21:04), Max: 36.9 (10-20-18 @ 05:18)  HR: 97 (10-20-18 @ 21:04) (90 - 98)  BP: 113/76 (10-20-18 @ 21:04) (96/67 - 113/76)  RR: 18 (10-20-18 @ 21:04) (18 - 18)  SpO2: 100% (10-20-18 @ 21:04) (100% - 100%)    Physical Exam:  General: NAD  Abdomen: Soft, non-tender, non-distended, fundus firm  Pelvic: Lochia wnl    Labs:    Blood Type: O Positive  Antibody Screen: --  RPR: Negative               9.0    9.3   )-----------( 179      ( 10-20 @ 16:40 )             26.5                8.9    12.2  )-----------( 171      ( 10-19 @ 07:17 )             27.7                10.5   13.6  )-----------( 176      ( 10-18 @ 16:39 )             30.0         MEDICATIONS  (STANDING):  acetaminophen   Tablet .. 975 milliGRAM(s) Oral every 6 hours  docusate sodium 100 milliGRAM(s) Oral three times a day  influenza   Vaccine 0.5 milliLiter(s) IntraMuscular once  lactated ringers. 1000 milliLiter(s) (125 mL/Hr) IV Continuous <Continuous>  misoprostol Oral Solution 60 MICROGram(s) Oral every 2 hours  ondansetron Injectable 4 milliGRAM(s) IV Push once  ondansetron Injectable 4 milliGRAM(s) IV Push once  oxyCODONE    IR 5 milliGRAM(s) Oral every 3 hours  oxytocin Infusion 20 milliUNIT(s)/Min (60 mL/Hr) IV Continuous <Continuous>  oxytocin Infusion 2 milliUNIT(s)/Min (2 mL/Hr) IV Continuous <Continuous>  oxytocin Infusion 41.667 milliUNIT(s)/Min (125 mL/Hr) IV Continuous <Continuous>  prothrombin complex concentrate IVPB (KCENTRA) 1500 International Unit(s) IV Intermittent once  sodium chloride 0.9% lock flush 3 milliLiter(s) IV Push every 8 hours    MEDICATIONS  (PRN):  dibucaine 1% Ointment 1 Application(s) Topical every 4 hours PRN Tenderness of the episiotomy site  hydrocortisone 1% Cream 1 Application(s) Topical every 4 hours PRN Moderate to Severe Perineal Pain  oxyCODONE    IR 5 milliGRAM(s) Oral every 4 hours PRN Severe Pain (7 -10)  pramoxine 1%/zinc 5% Cream 1 Application(s) Topical every 4 hours PRN Moderate to Severe Perineal Pain  witch hazel Pads 1 Application(s) Topical every 4 hours PRN Perineal discomfort

## 2018-10-21 NOTE — DISCHARGE NOTE OB - CARE PROVIDER_API CALL
Cheerlle Dasilva (MD), [facility]BS[facility]  General  [address] [address]  Phone: [phone]  Fax: [phone]

## 2018-10-21 NOTE — DISCHARGE NOTE OB - PATIENT PORTAL LINK FT
You can access the [facility] Patient Portal, offered by [facility], by registering with the following website: [email or web address]

## 2018-10-21 NOTE — DISCHARGE NOTE OB - MEDICATION SUMMARY - MEDICATIONS TO TAKE
I will START or STAY ON the medications listed below when I get home from the hospital:    acetaminophen 325 mg oral tablet  -- 3 tab(s) by mouth every 6 hours  -- Indication: For vainal delivery

## 2018-10-21 NOTE — PROGRESS NOTE ADULT - ASSESSMENT
34y/o  PPD#1 from  EBL 1500 due to PPH status post methergine IM X3, cytotec, TXA x2, cryo and K centra. Pt is Sikh. Pt also had PPT of 38.6 currently on Invanz. Currently in stable condition
34y/o  PPD#3 from  EBL 1500 due to PPH status post methergine IM X3, cytotec, TXA x2, cryo and K centra. Pt is Baptist. Pt also had PPT of 38.6 currently, s/p Invanz
32y/o  PPD#2 from  EBL 1500 due to PPH status post methergine IM X3, cytotec, TXA x2, cryo and K centra. Pt is Anabaptist. Pt also had PPT of 38.6 currently on Invanz. Currently in stable condition

## 2018-10-23 LAB
CULTURE RESULTS: SIGNIFICANT CHANGE UP
SPECIMEN SOURCE: SIGNIFICANT CHANGE UP

## 2018-11-27 ENCOUNTER — APPOINTMENT (OUTPATIENT)
Dept: OBGYN | Facility: CLINIC | Age: 33
End: 2018-11-27
Payer: COMMERCIAL

## 2018-11-27 PROCEDURE — 0503F POSTPARTUM CARE VISIT: CPT

## 2019-01-24 ENCOUNTER — APPOINTMENT (OUTPATIENT)
Dept: MATERNAL FETAL MEDICINE | Facility: CLINIC | Age: 34
End: 2019-01-24
Payer: COMMERCIAL

## 2019-01-24 PROCEDURE — G0109 DIAB MANAGE TRN IND/GROUP: CPT

## 2019-01-28 LAB
GLUCOSE 2H P 100 G GLC PO SERPL-MCNC: 83 MG/DL
GLUCOSE BS SERPL-MCNC: 85 MG/DL

## 2019-02-15 ENCOUNTER — EMERGENCY (EMERGENCY)
Facility: HOSPITAL | Age: 34
LOS: 1 days | Discharge: HOME | End: 2019-02-15
Attending: EMERGENCY MEDICINE
Payer: COMMERCIAL

## 2019-02-15 VITALS
WEIGHT: 136.03 LBS | OXYGEN SATURATION: 99 % | DIASTOLIC BLOOD PRESSURE: 67 MMHG | HEART RATE: 110 BPM | HEIGHT: 59 IN | RESPIRATION RATE: 18 BRPM | SYSTOLIC BLOOD PRESSURE: 104 MMHG

## 2019-02-15 DIAGNOSIS — Z98.1 ARTHRODESIS STATUS: Chronic | ICD-10-CM

## 2019-02-15 PROCEDURE — 99283 EMERGENCY DEPT VISIT LOW MDM: CPT | Mod: 25

## 2019-02-15 NOTE — ED ADULT NURSE NOTE - NSIMPLEMENTINTERV_GEN_ALL_ED
Implemented All Fall Risk Interventions:  [place] to call system. Call bell, personal items and telephone within reach. Instruct patient to call for assistance. Room bathroom lighting operational. Non-slip footwear when patient is off stretcher. Physically safe environment: no spills, clutter or unnecessary equipment. Stretcher in lowest position, wheels locked, appropriate side rails in place. Provide visual cue, wrist band, yellow gown, etc. Monitor gait and stability. Monitor for mental status changes and reorient to person, place, and time. Review medications for side effects contributing to fall risk. Reinforce activity limits and safety measures with patient and family. no

## 2019-02-16 VITALS
HEART RATE: 111 BPM | DIASTOLIC BLOOD PRESSURE: 64 MMHG | RESPIRATION RATE: 18 BRPM | TEMPERATURE: 98 F | SYSTOLIC BLOOD PRESSURE: 98 MMHG | OXYGEN SATURATION: 98 %

## 2019-02-16 PROCEDURE — 99283 EMERGENCY DEPT VISIT LOW MDM: CPT

## 2019-02-16 RX ORDER — IBUPROFEN 200 MG
600 TABLET ORAL ONCE
Qty: 0 | Refills: 0 | Status: COMPLETED | OUTPATIENT
Start: 2019-02-15 | End: 2019-02-15

## 2019-02-16 RX ADMIN — Medication 600 MILLIGRAM(S): at 00:11

## 2019-02-16 NOTE — ED PROVIDER NOTE - NSFOLLOWUPINSTRUCTIONS_ED_ALL_ED_FT
Ice to pain area.  Keep the knee immobilizer.  Motrin (Advil or Ibuprofen) 600mg every 8hours for pain with food.  Follow up with your orthopedist or Dr. on a list, call Monday for an appointment.  Return for any concerns or worsening symptoms.

## 2019-02-16 NOTE — ED PROVIDER NOTE - ATTENDING CONTRIBUTION TO CARE
I have seen and evaluated this patient with the [facility] practice clinician.   I agree with the findings  unless other wise stated. I have amended notes where needed.  After my face to face bedside evaluation, I am noting: Sudden right knee pain and inability to move the knee recurrent while dancing Right knee in semi flexed position with patella shifted laterally Patella relocated and normal ROM of the knee confirmed knee immobilizer applied --Mota

## 2019-02-16 NOTE — ED PROVIDER NOTE - PHYSICAL EXAMINATION
NAD NAD, Hypotensive (her baseline as per pt), Afebrile, No spinal tender, Lungs clear. ABD soft, non tender. No pelvic or hip tender. Right patellar dislocation laterally with tender/ deform. N/V- intact.

## 2019-02-16 NOTE — ED PROVIDER NOTE - CLINICAL SUMMARY MEDICAL DECISION MAKING FREE TEXT BOX
Sudden right knee pain and inability to move the knee recurrent while dancing Right knee in semi flexed position with patella shifted laterally Patella relocated and normal ROM of the knee confirmed knee immobilizer applied --Mota

## 2019-02-16 NOTE — ED PROVIDER NOTE - OBJECTIVE STATEMENT
32yo female pt with h/o multiple right knee dislocation was brought to ED by EMS c/o right knee dislocation while dancing this evening. Denies fall or other injuries. Denies sensory changes or weakness to extremities. Denies fever, chills or recent sickness. Denies CP/SOB/ABD pain or pelvic/ hip pain. Denies headache, neck or back pain.

## 2019-04-09 ENCOUNTER — APPOINTMENT (OUTPATIENT)
Dept: OBGYN | Facility: CLINIC | Age: 34
End: 2019-04-09
Payer: COMMERCIAL

## 2019-04-09 PROCEDURE — 99213 OFFICE O/P EST LOW 20 MIN: CPT

## 2019-05-06 ENCOUNTER — EMERGENCY (EMERGENCY)
Facility: HOSPITAL | Age: 34
LOS: 1 days | Discharge: HOME | End: 2019-05-06
Attending: EMERGENCY MEDICINE
Payer: COMMERCIAL

## 2019-05-06 VITALS
WEIGHT: 136.91 LBS | OXYGEN SATURATION: 98 % | TEMPERATURE: 98 F | SYSTOLIC BLOOD PRESSURE: 118 MMHG | HEART RATE: 89 BPM | HEIGHT: 59 IN | DIASTOLIC BLOOD PRESSURE: 81 MMHG | RESPIRATION RATE: 20 BRPM

## 2019-05-06 DIAGNOSIS — Z98.1 ARTHRODESIS STATUS: Chronic | ICD-10-CM

## 2019-05-06 PROCEDURE — 99284 EMERGENCY DEPT VISIT MOD MDM: CPT

## 2019-05-06 RX ORDER — ONDANSETRON 8 MG/1
4 TABLET, FILM COATED ORAL ONCE
Qty: 0 | Refills: 0 | Status: COMPLETED | OUTPATIENT
Start: 2019-05-06 | End: 2019-05-06

## 2019-05-06 RX ORDER — SODIUM CHLORIDE 9 MG/ML
3 INJECTION INTRAMUSCULAR; INTRAVENOUS; SUBCUTANEOUS ONCE
Qty: 0 | Refills: 0 | Status: COMPLETED | OUTPATIENT
Start: 2019-05-06 | End: 2019-05-06

## 2019-05-06 RX ORDER — KETOROLAC TROMETHAMINE 30 MG/ML
15 SYRINGE (ML) INJECTION ONCE
Qty: 0 | Refills: 0 | Status: DISCONTINUED | OUTPATIENT
Start: 2019-05-06 | End: 2019-05-06

## 2019-05-06 RX ADMIN — ONDANSETRON 4 MILLIGRAM(S): 8 TABLET, FILM COATED ORAL at 23:42

## 2019-05-06 RX ADMIN — SODIUM CHLORIDE 3 MILLILITER(S): 9 INJECTION INTRAMUSCULAR; INTRAVENOUS; SUBCUTANEOUS at 23:53

## 2019-05-06 NOTE — ED PROVIDER NOTE - PHYSICAL EXAMINATION
PHYSICAL EXAM:  General: No acute distress.  HEENT: NCAT.  PERRL.  EOMI.  No scleral icterus or injection.  Moist MM.  No oropharyngeal exudates.    Neck: Supple.  Full ROM.  No JVD.  No thyromegaly. No lymphadenopathy.   Heart: RRR.  Normal S1 and S2.  No murmurs, rubs, or gallops.   Lungs: CTAB. No wheezes, crackles, or rhonchi.    Abdomen: BS+, tenderness in RUQ, no guarding/rigidity  Skin: Warm and dry.  No rashes.  Extremities: No edema, clubbing, or cyanosis.  2+ peripheral pulses b/l.  Musculoskeletal: No deformities.  No spinal or paraspinal tenderness.  Neuro: A&Ox3.  CN II-XII intact.  5/5 strength in UE and LE b/l.  Tactile sensation intact in UE and LE b/l.  Cerebellar function intact

## 2019-05-06 NOTE — ED PROVIDER NOTE - FAMILY HISTORY
Father  Still living? Unknown  Family history of prostate cancer in father, Age at diagnosis: Age Unknown     Mother  Still living? Unknown  Family history of diabetes mellitus (DM), Age at diagnosis: Age Unknown

## 2019-05-06 NOTE — ED PROVIDER NOTE - PROGRESS NOTE DETAILS
Attending MD Turner: LISANDRO nonactionable, surgery informed and want to come re-eval, patient informed, feels improved, tolerating po, will await surgery, if cleared, will discharge home.  Patient amenable. Kai PGY2: called [facility] team to update on LISANDRO read. Okay with dc, will see patient bedside/clear for discharge and outpatient follow up. Maldonado, PGY1: outpt followup. patient ready for discharge

## 2019-05-06 NOTE — ED PROVIDER NOTE - ATTENDING CONTRIBUTION TO CARE
Patient presenting with sudden onset epigastric to RUQ abdominal pains earlier this afternoon.  No history of similar pains in past.  Associated with nausea and vomiting, normal bowel movements.  No prior abdominal surgeries.  Has not taken anything at home for pain.    PMH:  Asthma  PSH:  posterior spinal fusion for scoliosis  Social History: Non smoker, no alcohol    A 14 point review of systems is negative except as in HPI or otherwise documented.    Exam:  General: Patient well appearing, vital signs within normal limits  HEENT: airway patent with moist mucous membranes  Cardiac: RRR S1/S2 with strong peripheral pulses  Respiratory: lungs clear without respiratory distress  GI: abdomen soft, tender to palpation in RUQ reproduces complaint with + Farr's sign  Neuro: no gross neurologic deficits  Skin: warm, well perfused  Psych: normal mood and affect    Patient presenting with exam and history concerning for cholecystitis versus symptomatic cholelithiasis versus choledocholithiasis, also possible pancreatic or hepatic pathology - plan for labs, pain control, antiemetics, RUQ US, likely surgical consultation.

## 2019-05-06 NOTE — ED PROVIDER NOTE - CARE PROVIDER_API CALL
Stephanie Goodman)  ColonRectal Surgery; Surgery  [address], [address]  [place] [address]  Phone: [phone]  Fax: [phone]  Follow Up Time:

## 2019-05-06 NOTE — ED ADULT NURSE NOTE - CHPI ED NUR SYMPTOMS NEG
no chills/no burning urination/no abdominal distension/no blood in stool/no fever/no hematuria/no nausea/no diarrhea/no dysuria

## 2019-05-06 NOTE — ED PROVIDER NOTE - OBJECTIVE STATEMENT
Pt is a 33yF with no significant pmh, came in with abdominal pain since 6:00pm tonight. Pain in her RUQ nonradiating. LMP started last week and still ongoing. Pt states came home from work, and started feeling RUQ pain so came into the ED. Never had pain like this before. Had 3 episodes of vomiting, no blood in the vomitus. Still has some nausea. No fevers/chills. Further denies, SOB/CP, palpitations, numbness/tingling. No diarrhea. Last ate at 1:00pm.

## 2019-05-06 NOTE — ED ADULT NURSE NOTE - OBJECTIVE STATEMENT
32 y/o female  arrives to ED with c/o abdomen pain, n/v starting at 6pm tonight. Patient reports she had 3 episodes non-bloody emesis. Patient arrives to ED c/o abdomen pain 8/10 to RUQ, non distended. Patient is a/ox3, denies fever/chills, shortness of breath, chest pain, no diarrhea/sick contacts. Patient reports LMP last saturday, no changes to menses. States she takes oral contraceptive. Denies urinary symptoms.

## 2019-05-06 NOTE — ED PROVIDER NOTE - NSFOLLOWUPINSTRUCTIONS_ED_ALL_ED_FT
1. Please follow up with PCP outpatient  2. Follow-up with Dr. Goodman as an outpatient.   [address], [place] [address]  [phone]  3. Return to ED if pain worsens and need attention emergently.

## 2019-05-06 NOTE — ED ADULT NURSE NOTE - NS_ED_NURSE_TEACHING_TOPIC_ED_A_ED
Pt discharged, VSS, afebrile, ambulating independently. Nurse clearly reviewed discharge instructions, followup care, and when to return to the ED - Pt verbalized understanding./Digestive

## 2019-05-06 NOTE — ED PROVIDER NOTE - SHIFT CHANGE DETAILS
Attending MD Turner: US with cholecystitis, seen by surgery, requested HIDA because patient with minimal pain and tolerating po, pending HIDA

## 2019-05-07 VITALS
HEART RATE: 93 BPM | OXYGEN SATURATION: 100 % | SYSTOLIC BLOOD PRESSURE: 108 MMHG | DIASTOLIC BLOOD PRESSURE: 75 MMHG | TEMPERATURE: 98 F | RESPIRATION RATE: 16 BRPM

## 2019-05-07 LAB
ALBUMIN SERPL ELPH-MCNC: 4 G/DL — SIGNIFICANT CHANGE UP (ref 3.3–5)
ALBUMIN SERPL ELPH-MCNC: 4.5 G/DL — SIGNIFICANT CHANGE UP (ref 3.3–5)
ALP SERPL-CCNC: 70 U/L — SIGNIFICANT CHANGE UP (ref 40–120)
ALP SERPL-CCNC: 78 U/L — SIGNIFICANT CHANGE UP (ref 40–120)
ALT FLD-CCNC: 38 U/L — SIGNIFICANT CHANGE UP (ref 10–45)
ALT FLD-CCNC: 46 U/L — HIGH (ref 10–45)
ANION GAP SERPL CALC-SCNC: 10 MMOL/L — SIGNIFICANT CHANGE UP (ref 5–17)
ANION GAP SERPL CALC-SCNC: 10 MMOL/L — SIGNIFICANT CHANGE UP (ref 5–17)
APPEARANCE UR: CLEAR — SIGNIFICANT CHANGE UP
APTT BLD: 27.4 SEC — LOW (ref 27.5–36.3)
AST SERPL-CCNC: 41 U/L — HIGH (ref 10–40)
AST SERPL-CCNC: 91 U/L — HIGH (ref 10–40)
BACTERIA # UR AUTO: NEGATIVE — SIGNIFICANT CHANGE UP
BASOPHILS # BLD AUTO: 0 K/UL — SIGNIFICANT CHANGE UP (ref 0–0.2)
BASOPHILS NFR BLD AUTO: 0.2 % — SIGNIFICANT CHANGE UP (ref 0–2)
BILIRUB SERPL-MCNC: 0.7 MG/DL — SIGNIFICANT CHANGE UP (ref 0.2–1.2)
BILIRUB SERPL-MCNC: 0.8 MG/DL — SIGNIFICANT CHANGE UP (ref 0.2–1.2)
BILIRUB UR-MCNC: NEGATIVE — SIGNIFICANT CHANGE UP
BLD GP AB SCN SERPL QL: NEGATIVE — SIGNIFICANT CHANGE UP
BUN SERPL-MCNC: 11 MG/DL — SIGNIFICANT CHANGE UP (ref 7–23)
BUN SERPL-MCNC: 13 MG/DL — SIGNIFICANT CHANGE UP (ref 7–23)
CALCIUM SERPL-MCNC: 9.2 MG/DL — SIGNIFICANT CHANGE UP (ref 8.4–10.5)
CALCIUM SERPL-MCNC: 9.6 MG/DL — SIGNIFICANT CHANGE UP (ref 8.4–10.5)
CHLORIDE SERPL-SCNC: 101 MMOL/L — SIGNIFICANT CHANGE UP (ref 96–108)
CHLORIDE SERPL-SCNC: 103 MMOL/L — SIGNIFICANT CHANGE UP (ref 96–108)
CO2 SERPL-SCNC: 21 MMOL/L — LOW (ref 22–31)
CO2 SERPL-SCNC: 23 MMOL/L — SIGNIFICANT CHANGE UP (ref 22–31)
COLOR SPEC: YELLOW — SIGNIFICANT CHANGE UP
CREAT SERPL-MCNC: 0.65 MG/DL — SIGNIFICANT CHANGE UP (ref 0.5–1.3)
CREAT SERPL-MCNC: 0.66 MG/DL — SIGNIFICANT CHANGE UP (ref 0.5–1.3)
DIFF PNL FLD: NEGATIVE — SIGNIFICANT CHANGE UP
EOSINOPHIL # BLD AUTO: 0 K/UL — SIGNIFICANT CHANGE UP (ref 0–0.5)
EOSINOPHIL NFR BLD AUTO: 0.2 % — SIGNIFICANT CHANGE UP (ref 0–6)
EPI CELLS # UR: 1 /HPF — SIGNIFICANT CHANGE UP
GLUCOSE SERPL-MCNC: 113 MG/DL — HIGH (ref 70–99)
GLUCOSE SERPL-MCNC: 115 MG/DL — HIGH (ref 70–99)
GLUCOSE UR QL: NEGATIVE — SIGNIFICANT CHANGE UP
HCG UR QL: NEGATIVE — SIGNIFICANT CHANGE UP
HCT VFR BLD CALC: 45.9 % — HIGH (ref 34.5–45)
HGB BLD-MCNC: 15.5 G/DL — SIGNIFICANT CHANGE UP (ref 11.5–15.5)
HYALINE CASTS # UR AUTO: 2 /LPF — SIGNIFICANT CHANGE UP (ref 0–2)
INR BLD: 1.03 RATIO — SIGNIFICANT CHANGE UP (ref 0.88–1.16)
KETONES UR-MCNC: ABNORMAL
LEUKOCYTE ESTERASE UR-ACNC: NEGATIVE — SIGNIFICANT CHANGE UP
LIDOCAIN IGE QN: 34 U/L — SIGNIFICANT CHANGE UP (ref 7–60)
LYMPHOCYTES # BLD AUTO: 1.3 K/UL — SIGNIFICANT CHANGE UP (ref 1–3.3)
LYMPHOCYTES # BLD AUTO: 15.9 % — SIGNIFICANT CHANGE UP (ref 13–44)
MCHC RBC-ENTMCNC: 29.4 PG — SIGNIFICANT CHANGE UP (ref 27–34)
MCHC RBC-ENTMCNC: 33.8 GM/DL — SIGNIFICANT CHANGE UP (ref 32–36)
MCV RBC AUTO: 86.9 FL — SIGNIFICANT CHANGE UP (ref 80–100)
MONOCYTES # BLD AUTO: 0.2 K/UL — SIGNIFICANT CHANGE UP (ref 0–0.9)
MONOCYTES NFR BLD AUTO: 2.6 % — SIGNIFICANT CHANGE UP (ref 2–14)
NEUTROPHILS # BLD AUTO: 6.4 K/UL — SIGNIFICANT CHANGE UP (ref 1.8–7.4)
NEUTROPHILS NFR BLD AUTO: 81.1 % — HIGH (ref 43–77)
NITRITE UR-MCNC: NEGATIVE — SIGNIFICANT CHANGE UP
PH UR: 7 — SIGNIFICANT CHANGE UP (ref 5–8)
PLATELET # BLD AUTO: 342 K/UL — SIGNIFICANT CHANGE UP (ref 150–400)
POTASSIUM SERPL-MCNC: 4.6 MMOL/L — SIGNIFICANT CHANGE UP (ref 3.5–5.3)
POTASSIUM SERPL-MCNC: 8.5 MMOL/L — CRITICAL HIGH (ref 3.5–5.3)
POTASSIUM SERPL-SCNC: 4.6 MMOL/L — SIGNIFICANT CHANGE UP (ref 3.5–5.3)
POTASSIUM SERPL-SCNC: 8.5 MMOL/L — CRITICAL HIGH (ref 3.5–5.3)
PROT SERPL-MCNC: 7.5 G/DL — SIGNIFICANT CHANGE UP (ref 6–8.3)
PROT SERPL-MCNC: 8.3 G/DL — SIGNIFICANT CHANGE UP (ref 6–8.3)
PROT UR-MCNC: ABNORMAL
PROTHROM AB SERPL-ACNC: 11.9 SEC — SIGNIFICANT CHANGE UP (ref 10–12.9)
RBC # BLD: 5.28 M/UL — HIGH (ref 3.8–5.2)
RBC # FLD: 12.7 % — SIGNIFICANT CHANGE UP (ref 10.3–14.5)
RBC CASTS # UR COMP ASSIST: 3 /HPF — SIGNIFICANT CHANGE UP (ref 0–4)
RH IG SCN BLD-IMP: POSITIVE — SIGNIFICANT CHANGE UP
SODIUM SERPL-SCNC: 134 MMOL/L — LOW (ref 135–145)
SODIUM SERPL-SCNC: 134 MMOL/L — LOW (ref 135–145)
SP GR SPEC: 1.03 — HIGH (ref 1.01–1.02)
UROBILINOGEN FLD QL: NEGATIVE — SIGNIFICANT CHANGE UP
WBC # BLD: 7.9 K/UL — SIGNIFICANT CHANGE UP (ref 3.8–10.5)
WBC # FLD AUTO: 7.9 K/UL — SIGNIFICANT CHANGE UP (ref 3.8–10.5)
WBC UR QL: 1 /HPF — SIGNIFICANT CHANGE UP (ref 0–5)

## 2019-05-07 PROCEDURE — 96375 TX/PRO/DX INJ NEW DRUG ADDON: CPT

## 2019-05-07 PROCEDURE — 78226 HEPATOBILIARY SYSTEM IMAGING: CPT | Mod: 26

## 2019-05-07 PROCEDURE — 83690 ASSAY OF LIPASE: CPT

## 2019-05-07 PROCEDURE — 96374 THER/PROPH/DIAG INJ IV PUSH: CPT

## 2019-05-07 PROCEDURE — 86900 BLOOD TYPING SEROLOGIC ABO: CPT

## 2019-05-07 PROCEDURE — 86850 RBC ANTIBODY SCREEN: CPT

## 2019-05-07 PROCEDURE — 81025 URINE PREGNANCY TEST: CPT

## 2019-05-07 PROCEDURE — 85027 COMPLETE CBC AUTOMATED: CPT

## 2019-05-07 PROCEDURE — 78226 HEPATOBILIARY SYSTEM IMAGING: CPT

## 2019-05-07 PROCEDURE — 81001 URINALYSIS AUTO W/SCOPE: CPT

## 2019-05-07 PROCEDURE — 99284 EMERGENCY DEPT VISIT MOD MDM: CPT | Mod: 25

## 2019-05-07 PROCEDURE — 76700 US EXAM ABDOM COMPLETE: CPT

## 2019-05-07 PROCEDURE — 86901 BLOOD TYPING SEROLOGIC RH(D): CPT

## 2019-05-07 PROCEDURE — A9537: CPT

## 2019-05-07 PROCEDURE — 80053 COMPREHEN METABOLIC PANEL: CPT

## 2019-05-07 PROCEDURE — 85730 THROMBOPLASTIN TIME PARTIAL: CPT

## 2019-05-07 PROCEDURE — 85610 PROTHROMBIN TIME: CPT

## 2019-05-07 PROCEDURE — 76700 US EXAM ABDOM COMPLETE: CPT | Mod: 26

## 2019-05-07 RX ORDER — SODIUM CHLORIDE 9 MG/ML
1000 INJECTION INTRAMUSCULAR; INTRAVENOUS; SUBCUTANEOUS ONCE
Qty: 0 | Refills: 0 | Status: COMPLETED | OUTPATIENT
Start: 2019-05-07 | End: 2019-05-07

## 2019-05-07 RX ORDER — PIPERACILLIN AND TAZOBACTAM 4; .5 G/20ML; G/20ML
3.38 INJECTION, POWDER, LYOPHILIZED, FOR SOLUTION INTRAVENOUS ONCE
Qty: 0 | Refills: 0 | Status: COMPLETED | OUTPATIENT
Start: 2019-05-07 | End: 2019-05-07

## 2019-05-07 RX ADMIN — SODIUM CHLORIDE 1000 MILLILITER(S): 9 INJECTION INTRAMUSCULAR; INTRAVENOUS; SUBCUTANEOUS at 11:15

## 2019-05-07 RX ADMIN — PIPERACILLIN AND TAZOBACTAM 200 GRAM(S): 4; .5 INJECTION, POWDER, LYOPHILIZED, FOR SOLUTION INTRAVENOUS at 02:51

## 2019-05-07 RX ADMIN — Medication 15 MILLIGRAM(S): at 00:31

## 2019-05-07 NOTE — CONSULT NOTE ADULT - SUBJECTIVE AND OBJECTIVE BOX
GENERAL SURGERY CONSULT NOTE    Patient is a 33y old  Female who presents with a chief complaint of abdominal pain    HPI:  34yo F with PMH of asthma, scoliosis s/p spinal fusion presents today with abdominal pain since 6pm. Pt had lunch at 1pm and then pain started suddenly at 6pm. She vomited 3x with no fever, chills, diarrhea, or sick contacts. She came to the ED for evaluation. She was given toradol at midnight with complete resolution of her pain. Zosyn was given at 2am due to US finding concerning for acute cholecystitis.     PAST MEDICAL & SURGICAL HISTORY:  Asthma  S/P spinal fusion    [  ] No significant past history as reviewed with the patient and family    FAMILY HISTORY:  Family history of diabetes mellitus (DM) (Mother)  Family history of prostate cancer in father (Father)    [  ] Family history not pertinent as reviewed with the patient and family    SOCIAL HISTORY: does not smoke, drinks 1-2 drinks/month    MEDICATIONS  (HOME): OCP    Allergies    No Known Drug Allergies  peanuts (Unknown)    Intolerances        Vital Signs Last 24 Hrs  T(C): 36.6 (07 May 2019 02:58), Max: 36.6 (07 May 2019 02:58)  T(F): 97.9 (07 May 2019 02:58), Max: 97.9 (07 May 2019 02:58)  HR: 86 (07 May 2019 02:58) (82 - 89)  BP: 97/61 (07 May 2019 02:58) (97/61 - 118/81)  BP(mean): --  RR: 17 (07 May 2019 02:58) (17 - 20)  SpO2: 97% (07 May 2019 02:58) (97% - 100%)  Daily Height in cm: 149.86 (06 May 2019 20:48)    Daily     Exam:  General: awake, alert, NAD  HEENT: NCAT, MMM  Resp: nonlabored  Chest: equal chest rise  Abd: soft, NT, ND, negative lopez sign  Ext: VALIENTE  Neuro: intact                          15.5   7.9   )-----------( 342      ( 06 May 2019 23:53 )             45.9     05-07    134<L>  |  103  |  11  ----------------------------<  113<H>  4.6   |  21<L>  |  0.65    Ca    9.2      07 May 2019 02:07    TPro  7.5  /  Alb  4.0  /  TBili  0.8  /  DBili  x   /  AST  41<H>  /  ALT  38  /  AlkPhos  78  05    PT/INR - ( 07 May 2019 04:08 )   PT: 11.9 sec;   INR: 1.03 ratio         PTT - ( 07 May 2019 04:08 )  PTT:27.4 sec  Urinalysis Basic - ( 06 May 2019 23:53 )    Color: Yellow / Appearance: Clear / S.033 / pH: x  Gluc: x / Ketone: Moderate  / Bili: Negative / Urobili: Negative   Blood: x / Protein: 30 mg/dL / Nitrite: Negative   Leuk Esterase: Negative / RBC: 3 /hpf / WBC 1 /HPF   Sq Epi: x / Non Sq Epi: 1 /hpf / Bacteria: Negative        IMAGING STUDIES:  < from: US Abdomen Complete (19 @ 01:04) >  FINDINGS:    Liver: Within normal limits.    Bile ducts: Enlarged Common bile duct measures 7 mm.     Gallbladder: Cholelithiasis. Mildly dilated with mild wall thickening up   to 4 mm and pericholecystic fluid.    Pancreas: Visualized portions are within normal limits.    Spleen: 8.6 cm. Within normal limits.    Right kidney: 9.6 cm. No hydronephrosis.     Left kidney: 9.7 cm.  No hydronephrosis.     Ascites: None.    Aorta and IVC: Visualized portions are within normal limits.    IMPRESSION:       Mildly dilated gallbladder with cholelithiasis and pericholecystic fluid   suggestive of cholecystitis.    < end of copied text >

## 2019-05-07 NOTE — CONSULT NOTE ADULT - ATTENDING COMMENTS
I saw and examined the pt and discussed the tx plan with the House Staff. I agree with the exam and plan as documented in the above consult.  Pain resolved, US with LISANDRO JUDD no acute diane.  She needs elective lap diane, discussed and instructed her to see me in the office to schedule.   OK to discharge from ED.  Stephanie Goodman MD

## 2019-05-07 NOTE — ED ADULT NURSE REASSESSMENT NOTE - NS ED NURSE REASSESS COMMENT FT1
Patient awaiting surgery consult. Verbalized improvement in pain, no acute distress, no n/v/d at this time. VSS, afebrile. Safety maintained.

## 2019-05-07 NOTE — CONSULT NOTE ADULT - ASSESSMENT
32yo F with abdominal pain, vomiting, and US with stones, mild gallbladder wall thickening, and mild pericholecystic fluid. Pts patient is completely resolved at the time of evaluation and she does not want to have her gallbladder removed today, she prefers to follow up as an outpatient to schedule surgery.     - ok to PO trial  - if pain recurs, will need admission, abx, and OR  - discussed return precautions with patient and that she has gallstones and will need to follow up as an outpatient to schedule a cholecystectomy  - will follow and re-evaluate     will discuss with Dr. Goodman  [place] [facility]  [id number] 34yo F with abdominal pain, vomiting, and US with stones, mild gallbladder wall thickening, and mild pericholecystic fluid. Pts patient is completely resolved at the time of evaluation and she does not want to have her gallbladder removed today, she prefers to follow up as an outpatient to schedule surgery.     - Please check a HIDA scan. If normal, would be ok to follow up as an outpatient for cholecystectomy.   - will follow and re-evaluate (please call [facility] when HIDA complete)  - discussed with ED    discussed with Dr. Goodman  [place] [facility]  [id number] 32yo F with abdominal pain, vomiting, and US with stones, mild gallbladder wall thickening, and mild pericholecystic fluid. Pts patient is completely resolved at the time of evaluation and she does not want to have her gallbladder removed today, she prefers to follow up as an outpatient to schedule surgery.     - HIDA scan negative, no signs of acute cholecystitis, no acute surgical intervention  - Please have patient follow-up with Dr. Goodman as an outpatient.   - discussed with ED    discussed with Dr. Goodman  [place] [facility]  [phone]

## 2019-05-07 NOTE — ED ADULT NURSE REASSESSMENT NOTE - NS ED NURSE REASSESS COMMENT FT1
Patient a&ox3, no acute distress, resp nonlabored, resting comfortably in bed with family at bedside.   Denies headache, dizziness, chest pain, palpitations, SOB, abd pain, n/v/d, urinary symptoms, fevers, chills, weakness at this time. Patient awaiting test results. Safety maintained.

## 2019-05-08 ENCOUNTER — APPOINTMENT (OUTPATIENT)
Dept: SURGERY | Facility: CLINIC | Age: 34
End: 2019-05-08
Payer: COMMERCIAL

## 2019-05-08 VITALS
RESPIRATION RATE: 16 BRPM | OXYGEN SATURATION: 100 % | HEIGHT: 60 IN | HEART RATE: 75 BPM | WEIGHT: 137 LBS | SYSTOLIC BLOOD PRESSURE: 118 MMHG | DIASTOLIC BLOOD PRESSURE: 83 MMHG | TEMPERATURE: 98.6 F | BODY MASS INDEX: 26.9 KG/M2

## 2019-05-08 PROCEDURE — 99214 OFFICE O/P EST MOD 30 MIN: CPT

## 2019-05-08 RX ORDER — FLUOCINOLONE ACETONIDE 0.11 MG/ML
0.01 OIL TOPICAL
Qty: 1 | Refills: 3 | Status: DISCONTINUED | COMMUNITY
Start: 2017-03-22 | End: 2019-05-08

## 2019-05-08 RX ORDER — PNV/FERROUS SULFATE/FOLIC ACID 27-<0.5MG
TABLET ORAL DAILY
Refills: 0 | Status: DISCONTINUED | COMMUNITY
End: 2019-05-08

## 2019-05-08 RX ORDER — CLINDAMYCIN PHOSPHATE AND TRETINOIN 10; .25 MG/G; MG/G
1.2-0.025 GEL TOPICAL
Qty: 1 | Refills: 6 | Status: DISCONTINUED | COMMUNITY
Start: 2017-03-22 | End: 2019-05-08

## 2019-05-08 RX ORDER — DOCUSATE SODIUM 100 MG/1
100 CAPSULE ORAL DAILY
Refills: 0 | Status: DISCONTINUED | COMMUNITY
End: 2019-05-08

## 2019-05-08 RX ORDER — KETOCONAZOLE 20.5 MG/ML
2 SHAMPOO, SUSPENSION TOPICAL
Qty: 6 | Refills: 6 | Status: DISCONTINUED | COMMUNITY
Start: 2017-03-22 | End: 2019-05-08

## 2019-05-08 RX ORDER — METRONIDAZOLE 7.5 MG/G
0.75 GEL VAGINAL
Qty: 70 | Refills: 0 | Status: DISCONTINUED | COMMUNITY
Start: 2018-12-04

## 2019-05-08 RX ORDER — URINE ACETONE TEST STRIPS
STRIP MISCELLANEOUS
Qty: 1 | Refills: 1 | Status: DISCONTINUED | COMMUNITY
Start: 2018-08-14 | End: 2019-05-08

## 2019-05-08 RX ORDER — TRIAMCINOLONE ACETONIDE 1 MG/G
0.1 CREAM TOPICAL
Qty: 1 | Refills: 3 | Status: DISCONTINUED | COMMUNITY
Start: 2017-03-22 | End: 2019-05-08

## 2019-05-08 RX ORDER — BLOOD SUGAR DIAGNOSTIC
STRIP MISCELLANEOUS
Qty: 2 | Refills: 6 | Status: DISCONTINUED | COMMUNITY
Start: 2018-08-14 | End: 2019-05-08

## 2019-05-08 RX ORDER — LANCETS 33 GAUGE
EACH MISCELLANEOUS
Qty: 2 | Refills: 6 | Status: DISCONTINUED | COMMUNITY
Start: 2018-08-14 | End: 2019-05-08

## 2019-05-08 RX ORDER — METFORMIN ER 500 MG 500 MG/1
500 TABLET ORAL
Qty: 30 | Refills: 1 | Status: DISCONTINUED | COMMUNITY
Start: 2018-09-24 | End: 2019-05-08

## 2019-05-08 RX ORDER — CHROMIUM 200 MCG
1000 TABLET ORAL DAILY
Refills: 0 | Status: DISCONTINUED | COMMUNITY
End: 2019-05-08

## 2019-05-10 ENCOUNTER — OUTPATIENT (OUTPATIENT)
Dept: OUTPATIENT SERVICES | Facility: HOSPITAL | Age: 34
LOS: 1 days | End: 2019-05-10
Payer: COMMERCIAL

## 2019-05-10 VITALS
OXYGEN SATURATION: 98 % | TEMPERATURE: 98 F | HEIGHT: 59 IN | HEART RATE: 78 BPM | DIASTOLIC BLOOD PRESSURE: 70 MMHG | WEIGHT: 138.01 LBS | SYSTOLIC BLOOD PRESSURE: 128 MMHG | RESPIRATION RATE: 18 BRPM

## 2019-05-10 DIAGNOSIS — Z98.1 ARTHRODESIS STATUS: Chronic | ICD-10-CM

## 2019-05-10 DIAGNOSIS — Z53.1 PROCEDURE AND TREATMENT NOT CARRIED OUT BECAUSE OF PATIENT'S DECISION FOR REASONS OF BELIEF AND GROUP PRESSURE: ICD-10-CM

## 2019-05-10 DIAGNOSIS — K80.20 CALCULUS OF GALLBLADDER WITHOUT CHOLECYSTITIS WITHOUT OBSTRUCTION: ICD-10-CM

## 2019-05-10 DIAGNOSIS — Z01.818 ENCOUNTER FOR OTHER PREPROCEDURAL EXAMINATION: ICD-10-CM

## 2019-05-10 LAB
ALBUMIN SERPL ELPH-MCNC: 4.3 G/DL — SIGNIFICANT CHANGE UP (ref 3.3–5)
ALP SERPL-CCNC: 83 U/L — SIGNIFICANT CHANGE UP (ref 40–120)
ALT FLD-CCNC: 29 U/L — SIGNIFICANT CHANGE UP (ref 10–45)
ANION GAP SERPL CALC-SCNC: 13 MMOL/L — SIGNIFICANT CHANGE UP (ref 5–17)
AST SERPL-CCNC: 14 U/L — SIGNIFICANT CHANGE UP (ref 10–40)
BILIRUB SERPL-MCNC: 0.6 MG/DL — SIGNIFICANT CHANGE UP (ref 0.2–1.2)
BUN SERPL-MCNC: 18 MG/DL — SIGNIFICANT CHANGE UP (ref 7–23)
CALCIUM SERPL-MCNC: 9.7 MG/DL — SIGNIFICANT CHANGE UP (ref 8.4–10.5)
CHLORIDE SERPL-SCNC: 105 MMOL/L — SIGNIFICANT CHANGE UP (ref 96–108)
CO2 SERPL-SCNC: 22 MMOL/L — SIGNIFICANT CHANGE UP (ref 22–31)
CREAT SERPL-MCNC: 0.82 MG/DL — SIGNIFICANT CHANGE UP (ref 0.5–1.3)
GLUCOSE SERPL-MCNC: 69 MG/DL — LOW (ref 70–99)
HCT VFR BLD CALC: 44.3 % — SIGNIFICANT CHANGE UP (ref 34.5–45)
HGB BLD-MCNC: 14 G/DL — SIGNIFICANT CHANGE UP (ref 11.5–15.5)
MCHC RBC-ENTMCNC: 28.1 PG — SIGNIFICANT CHANGE UP (ref 27–34)
MCHC RBC-ENTMCNC: 31.6 GM/DL — LOW (ref 32–36)
MCV RBC AUTO: 88.8 FL — SIGNIFICANT CHANGE UP (ref 80–100)
PLATELET # BLD AUTO: 361 K/UL — SIGNIFICANT CHANGE UP (ref 150–400)
POTASSIUM SERPL-MCNC: 4.6 MMOL/L — SIGNIFICANT CHANGE UP (ref 3.5–5.3)
POTASSIUM SERPL-SCNC: 4.6 MMOL/L — SIGNIFICANT CHANGE UP (ref 3.5–5.3)
PROT SERPL-MCNC: 7.5 G/DL — SIGNIFICANT CHANGE UP (ref 6–8.3)
RBC # BLD: 4.99 M/UL — SIGNIFICANT CHANGE UP (ref 3.8–5.2)
RBC # FLD: 13.6 % — SIGNIFICANT CHANGE UP (ref 10.3–14.5)
SODIUM SERPL-SCNC: 140 MMOL/L — SIGNIFICANT CHANGE UP (ref 135–145)
WBC # BLD: 4.72 K/UL — SIGNIFICANT CHANGE UP (ref 3.8–10.5)
WBC # FLD AUTO: 4.72 K/UL — SIGNIFICANT CHANGE UP (ref 3.8–10.5)

## 2019-05-10 PROCEDURE — 80053 COMPREHEN METABOLIC PANEL: CPT

## 2019-05-10 PROCEDURE — G0463: CPT

## 2019-05-10 PROCEDURE — 85027 COMPLETE CBC AUTOMATED: CPT

## 2019-05-10 RX ORDER — CEFAZOLIN SODIUM 1 G
2000 VIAL (EA) INJECTION ONCE
Refills: 0 | Status: DISCONTINUED | OUTPATIENT
Start: 2019-05-14 | End: 2019-05-29

## 2019-05-10 NOTE — H&P PST ADULT - NSICDXFAMILYHX_GEN_ALL_CORE_FT
FAMILY HISTORY:  Father  Still living? Unknown  Family history of prostate cancer in father, Age at diagnosis: Age Unknown    Mother  Still living? Unknown  Family history of diabetes mellitus (DM), Age at diagnosis: Age Unknown

## 2019-05-10 NOTE — H&P PST ADULT - HISTORY OF PRESENT ILLNESS
This is a 34 y/o female c/o abdominal pain , sonogram revealed + calculus of gallbladder, she presents today for laparoscopic cholecystectomy.  pt is a Jehovah witness, This is a 32 y/o female c/o abdominal pain , sonogram revealed + calculus of gallbladder, she presents today for laparoscopic cholecystectomy.  pt is a Jehovah witness, Refusal of Blood Product Consent was given to pt

## 2019-05-10 NOTE — H&P PST ADULT - NSANTHOSAYNRD_GEN_A_CORE
No. YEFRI screening performed.  STOP BANG Legend: 0-2 = LOW Risk; 3-4 = INTERMEDIATE Risk; 5-8 = HIGH Risk

## 2019-05-10 NOTE — H&P PST ADULT - NSICDXPROBLEM_GEN_ALL_CORE_FT
PROBLEM DIAGNOSES  Problem: Refusal of blood transfusions as patient is Pentecostal  Assessment and Plan: informed surgeon via email    Problem: Calculus of gallbladder without cholecystitis without obstruction  Assessment and Plan: laparoscopic cholecystectomy

## 2019-05-10 NOTE — H&P PST ADULT - NSICDXPASTMEDICALHX_GEN_ALL_CORE_FT
PAST MEDICAL HISTORY:  Asthma controlled, on no med    Eczema     Gestational diabetes     Refusal of blood transfusions as patient is [employer] PAST MEDICAL HISTORY:  Asthma controlled, on no med    Eczema     Gestational diabetes     Refusal of blood transfusions as patient is Islam

## 2019-05-13 ENCOUNTER — TRANSCRIPTION ENCOUNTER (OUTPATIENT)
Age: 34
End: 2019-05-13

## 2019-05-14 ENCOUNTER — APPOINTMENT (OUTPATIENT)
Dept: SURGERY | Facility: HOSPITAL | Age: 34
End: 2019-05-14
Payer: COMMERCIAL

## 2019-05-14 ENCOUNTER — RESULT REVIEW (OUTPATIENT)
Age: 34
End: 2019-05-14

## 2019-05-14 ENCOUNTER — OUTPATIENT (OUTPATIENT)
Dept: OUTPATIENT SERVICES | Facility: HOSPITAL | Age: 34
LOS: 1 days | End: 2019-05-14
Payer: COMMERCIAL

## 2019-05-14 VITALS
SYSTOLIC BLOOD PRESSURE: 108 MMHG | RESPIRATION RATE: 16 BRPM | DIASTOLIC BLOOD PRESSURE: 67 MMHG | HEART RATE: 90 BPM | OXYGEN SATURATION: 97 %

## 2019-05-14 VITALS
TEMPERATURE: 98 F | SYSTOLIC BLOOD PRESSURE: 112 MMHG | RESPIRATION RATE: 16 BRPM | WEIGHT: 136.03 LBS | DIASTOLIC BLOOD PRESSURE: 75 MMHG | HEART RATE: 68 BPM | HEIGHT: 59 IN | OXYGEN SATURATION: 96 %

## 2019-05-14 DIAGNOSIS — Z98.1 ARTHRODESIS STATUS: Chronic | ICD-10-CM

## 2019-05-14 DIAGNOSIS — K80.20 CALCULUS OF GALLBLADDER WITHOUT CHOLECYSTITIS WITHOUT OBSTRUCTION: ICD-10-CM

## 2019-05-14 LAB — HCG UR QL: NEGATIVE — SIGNIFICANT CHANGE UP

## 2019-05-14 PROCEDURE — 47562 LAPAROSCOPIC CHOLECYSTECTOMY: CPT | Mod: 82

## 2019-05-14 PROCEDURE — 47562 LAPAROSCOPIC CHOLECYSTECTOMY: CPT

## 2019-05-14 PROCEDURE — 81025 URINE PREGNANCY TEST: CPT

## 2019-05-14 RX ORDER — OXYCODONE HYDROCHLORIDE 5 MG/1
5 TABLET ORAL EVERY 6 HOURS
Refills: 0 | Status: DISCONTINUED | OUTPATIENT
Start: 2019-05-14 | End: 2019-05-14

## 2019-05-14 RX ORDER — SODIUM CHLORIDE 9 MG/ML
3 INJECTION INTRAMUSCULAR; INTRAVENOUS; SUBCUTANEOUS EVERY 8 HOURS
Refills: 0 | Status: DISCONTINUED | OUTPATIENT
Start: 2019-05-14 | End: 2019-05-14

## 2019-05-14 RX ORDER — HYDROMORPHONE HYDROCHLORIDE 2 MG/ML
0.25 INJECTION INTRAMUSCULAR; INTRAVENOUS; SUBCUTANEOUS
Refills: 0 | Status: DISCONTINUED | OUTPATIENT
Start: 2019-05-14 | End: 2019-05-14

## 2019-05-14 RX ORDER — ONDANSETRON 8 MG/1
4 TABLET, FILM COATED ORAL ONCE
Refills: 0 | Status: COMPLETED | OUTPATIENT
Start: 2019-05-14 | End: 2019-05-14

## 2019-05-14 RX ORDER — OXYCODONE HYDROCHLORIDE 5 MG/1
1 TABLET ORAL
Qty: 10 | Refills: 0
Start: 2019-05-14

## 2019-05-14 RX ORDER — HYDROMORPHONE HYDROCHLORIDE 2 MG/ML
0.25 INJECTION INTRAMUSCULAR; INTRAVENOUS; SUBCUTANEOUS ONCE
Refills: 0 | Status: DISCONTINUED | OUTPATIENT
Start: 2019-05-14 | End: 2019-05-14

## 2019-05-14 RX ORDER — HYDROMORPHONE HYDROCHLORIDE 2 MG/ML
0.5 INJECTION INTRAMUSCULAR; INTRAVENOUS; SUBCUTANEOUS
Refills: 0 | Status: DISCONTINUED | OUTPATIENT
Start: 2019-05-14 | End: 2019-05-14

## 2019-05-14 RX ORDER — LIDOCAINE HCL 20 MG/ML
0.2 VIAL (ML) INJECTION ONCE
Refills: 0 | Status: DISCONTINUED | OUTPATIENT
Start: 2019-05-14 | End: 2019-05-14

## 2019-05-14 RX ADMIN — HYDROMORPHONE HYDROCHLORIDE 0.25 MILLIGRAM(S): 2 INJECTION INTRAMUSCULAR; INTRAVENOUS; SUBCUTANEOUS at 13:10

## 2019-05-14 RX ADMIN — ONDANSETRON 4 MILLIGRAM(S): 8 TABLET, FILM COATED ORAL at 13:44

## 2019-05-14 RX ADMIN — HYDROMORPHONE HYDROCHLORIDE 0.25 MILLIGRAM(S): 2 INJECTION INTRAMUSCULAR; INTRAVENOUS; SUBCUTANEOUS at 13:40

## 2019-05-14 RX ADMIN — HYDROMORPHONE HYDROCHLORIDE 0.25 MILLIGRAM(S): 2 INJECTION INTRAMUSCULAR; INTRAVENOUS; SUBCUTANEOUS at 13:45

## 2019-05-14 NOTE — BRIEF OPERATIVE NOTE - NSICDXBRIEFPREOP_GEN_ALL_CORE_FT
PRE-OP DIAGNOSIS:  Biliary colic 14-May-2019 13:03:35  Jojo Acosta  Biliary colic 14-May-2019 13:03:13  Jojo Acosta

## 2019-05-14 NOTE — ASU DISCHARGE PLAN (ADULT/PEDIATRIC) - CALL YOUR DOCTOR IF YOU HAVE ANY OF THE FOLLOWING:
Fever greater than (need to indicate Fahrenheit or Celsius)/Wound/Surgical Site with redness, or foul smelling discharge or pus/Bleeding that does not stop/Inability to tolerate liquids or foods

## 2019-05-20 PROBLEM — J45.909 UNSPECIFIED ASTHMA, UNCOMPLICATED: Chronic | Status: ACTIVE | Noted: 2017-07-25

## 2019-05-20 PROBLEM — L30.9 DERMATITIS, UNSPECIFIED: Chronic | Status: ACTIVE | Noted: 2019-05-10

## 2019-05-20 PROBLEM — O24.419 GESTATIONAL DIABETES MELLITUS IN PREGNANCY, UNSPECIFIED CONTROL: Chronic | Status: ACTIVE | Noted: 2019-05-10

## 2019-05-20 PROBLEM — Z53.1 PROCEDURE AND TREATMENT NOT CARRIED OUT BECAUSE OF PATIENT'S DECISION FOR REASONS OF BELIEF AND GROUP PRESSURE: Chronic | Status: ACTIVE | Noted: 2019-05-10

## 2019-05-21 NOTE — REASON FOR VISIT
[Consultation] : a consultation visit [Spouse] : spouse [FreeTextEntry1] : Symptomatic cholelithiasis

## 2019-05-21 NOTE — HISTORY OF PRESENT ILLNESS
[de-identified] : 32 y/o female here for consultation for cholelithiasis. She developed acute RUQ pain, nausea, vomiting and chills on 5/6 and was evaluated at the [facility] ED - I saw her there. An abdominal US showed cholelithiasis with possible cholecystitis. A HIDA was negative for acute diane. Her pain resolved and she was discharged with instructions for office f/u to schedule surgery.  \par Today patient has no pain. Has no nausea or vomiting. No changes in bowel habits.

## 2019-05-22 ENCOUNTER — TRANSCRIPTION ENCOUNTER (OUTPATIENT)
Age: 34
End: 2019-05-22

## 2019-05-24 ENCOUNTER — APPOINTMENT (OUTPATIENT)
Dept: OBGYN | Facility: CLINIC | Age: 34
End: 2019-05-24
Payer: COMMERCIAL

## 2019-05-24 PROCEDURE — 58300 INSERT INTRAUTERINE DEVICE: CPT

## 2019-05-24 PROCEDURE — 81025 URINE PREGNANCY TEST: CPT

## 2019-05-24 PROCEDURE — 76998 US GUIDE INTRAOP: CPT

## 2019-05-29 PROBLEM — K80.20 SYMPTOMATIC CHOLELITHIASIS: Status: RESOLVED | Noted: 2019-05-08 | Resolved: 2019-05-29

## 2019-05-29 LAB — SURGICAL PATHOLOGY STUDY: SIGNIFICANT CHANGE UP

## 2019-05-30 ENCOUNTER — APPOINTMENT (OUTPATIENT)
Dept: SURGERY | Facility: CLINIC | Age: 34
End: 2019-05-30
Payer: COMMERCIAL

## 2019-05-30 VITALS
HEART RATE: 79 BPM | DIASTOLIC BLOOD PRESSURE: 77 MMHG | SYSTOLIC BLOOD PRESSURE: 114 MMHG | OXYGEN SATURATION: 100 % | RESPIRATION RATE: 16 BRPM | TEMPERATURE: 97.4 F

## 2019-05-30 DIAGNOSIS — K80.20 CALCULUS OF GALLBLADDER W/OUT CHOLECYSTITIS W/OUT OBSTRUCTION: ICD-10-CM

## 2019-05-30 PROCEDURE — 99024 POSTOP FOLLOW-UP VISIT: CPT

## 2019-06-04 NOTE — REASON FOR VISIT
[Post Op: _________] : a [unfilled] post op visit [FreeTextEntry1] : Laparoscopic cholecystectomy 05/14/18.

## 2019-06-21 ENCOUNTER — APPOINTMENT (OUTPATIENT)
Dept: DERMATOLOGY | Facility: CLINIC | Age: 34
End: 2019-06-21
Payer: COMMERCIAL

## 2019-06-21 DIAGNOSIS — L30.9 DERMATITIS, UNSPECIFIED: ICD-10-CM

## 2019-06-21 PROCEDURE — 99214 OFFICE O/P EST MOD 30 MIN: CPT

## 2019-06-25 ENCOUNTER — APPOINTMENT (OUTPATIENT)
Dept: OBGYN | Facility: CLINIC | Age: 34
End: 2019-06-25
Payer: COMMERCIAL

## 2019-06-25 ENCOUNTER — RESULT REVIEW (OUTPATIENT)
Age: 34
End: 2019-06-25

## 2019-06-25 PROCEDURE — 99395 PREV VISIT EST AGE 18-39: CPT

## 2019-06-26 ENCOUNTER — RX RENEWAL (OUTPATIENT)
Age: 34
End: 2019-06-26

## 2019-07-23 NOTE — ED ADULT NURSE NOTE - OBJECTIVE STATEMENT
No
33 YOF A&Ox3 brought in by EMS for dislocation of right patella. EMS stated was dancing and stepped forward when knee dislocated. Patient stated her knee dislocates often. Patient rates pain 10/10, displacement noted. MD Mota at bedside repositioned knee. Patient's right knee wrapped in ace bandage and elevated with pillows.

## 2019-08-23 ENCOUNTER — APPOINTMENT (OUTPATIENT)
Dept: DERMATOLOGY | Facility: CLINIC | Age: 34
End: 2019-08-23
Payer: COMMERCIAL

## 2019-08-23 VITALS — DIASTOLIC BLOOD PRESSURE: 80 MMHG | SYSTOLIC BLOOD PRESSURE: 110 MMHG

## 2019-08-23 PROCEDURE — 99214 OFFICE O/P EST MOD 30 MIN: CPT | Mod: GC

## 2019-08-23 RX ORDER — CLINDAMYCIN PHOSPHATE AND TRETINOIN 10; .25 MG/G; MG/G
1.2-0.025 GEL TOPICAL
Qty: 1 | Refills: 2 | Status: ACTIVE | COMMUNITY
Start: 2019-08-23 | End: 1900-01-01

## 2019-11-04 ENCOUNTER — APPOINTMENT (OUTPATIENT)
Dept: DERMATOLOGY | Facility: CLINIC | Age: 34
End: 2019-11-04

## 2019-12-06 ENCOUNTER — APPOINTMENT (OUTPATIENT)
Dept: OBGYN | Facility: CLINIC | Age: 34
End: 2019-12-06
Payer: COMMERCIAL

## 2019-12-06 PROCEDURE — 99213 OFFICE O/P EST LOW 20 MIN: CPT

## 2020-03-10 ENCOUNTER — EMERGENCY (EMERGENCY)
Facility: HOSPITAL | Age: 35
LOS: 1 days | Discharge: HOME | End: 2020-03-10
Admitting: EMERGENCY MEDICINE
Payer: COMMERCIAL

## 2020-03-10 VITALS
RESPIRATION RATE: 16 BRPM | DIASTOLIC BLOOD PRESSURE: 69 MMHG | TEMPERATURE: 98 F | OXYGEN SATURATION: 100 % | HEART RATE: 79 BPM | SYSTOLIC BLOOD PRESSURE: 107 MMHG

## 2020-03-10 DIAGNOSIS — Z98.1 ARTHRODESIS STATUS: Chronic | ICD-10-CM

## 2020-03-10 PROCEDURE — 99283 EMERGENCY DEPT VISIT LOW MDM: CPT | Mod: 25

## 2020-03-10 PROCEDURE — 93010 ELECTROCARDIOGRAM REPORT: CPT

## 2020-03-10 RX ORDER — LIDOCAINE 4 G/100G
1 CREAM TOPICAL ONCE
Refills: 0 | Status: COMPLETED | OUTPATIENT
Start: 2020-03-10 | End: 2020-03-10

## 2020-03-10 RX ORDER — IBUPROFEN 200 MG
600 TABLET ORAL ONCE
Refills: 0 | Status: COMPLETED | OUTPATIENT
Start: 2020-03-10 | End: 2020-03-10

## 2020-03-10 RX ADMIN — Medication 600 MILLIGRAM(S): at 09:19

## 2020-03-10 RX ADMIN — LIDOCAINE 1 PATCH: 4 CREAM TOPICAL at 09:20

## 2020-03-10 NOTE — ED ADULT TRIAGE NOTE - CHIEF COMPLAINT QUOTE
Pt. woke up with c/o back pain/spasm and ringing in ears this morning. Pt. with hx of back spasm. denies any recent injuries. hx of scoliosis with hardware to back.

## 2020-03-10 NOTE — ED PROVIDER NOTE - PROGRESS NOTE DETAILS
EUGENE FARRELL:  Pt notes feeling better.  Pt medically stable for discharge.  Pt to follow up with PMD.  Strict return precautions given.

## 2020-03-10 NOTE — ED PROVIDER NOTE - NSFOLLOWUPINSTRUCTIONS_ED_ALL_ED_FT
Advance activity as tolerated.  Continue all previously prescribed medications as directed unless otherwise instructed.  Take Motrin (also sold as Advil or Ibuprofen) 400-600 mg (two or three 200 mg over the counter pills) every 8 hours as needed for moderate pain or fevers-- take with food.   Follow up with your primary care physician in 48-72 hours- bring copies of your results.  Return to the ER for worsening or persistent symptoms, including but not limited to worsening/persistent pain, numbness, weakness, headache, chest pain, palpitations, passing out, lightheadedness, dizziness, changes in vision or hearing, and/or ANY NEW OR CONCERNING SYMPTOMS. If you have issues obtaining follow up, please call: [phone] to obtain a doctor or specialist who takes your insurance in your area.  You may call [phone] to make an appointment with the internal medicine clinic.

## 2020-03-10 NOTE — ED PROVIDER NOTE - PMH
Asthma  controlled, on no med  Eczema    Gestational diabetes    Refusal of blood transfusions as patient is [employer]ian

## 2020-03-10 NOTE — ED ADULT NURSE NOTE - OBJECTIVE STATEMENT
Pt presenting to intake AxOx4 ambulatory at baseline with c/o upper right sided back pain x2 days. On arrival to ED pt's breathing is even and unlabored. Palor/diaphoresis not noted. Pt states when back pain began she also had dizziness as well as ringing in the ears. Pt currently denying ear ringing, dizziness, H/A, N/V, SOB, CP. Pt medicated in intake for symptoms. MD at bedside, will continue to monitor.

## 2020-03-10 NOTE — ED PROVIDER NOTE - CARE PLAN
Principal Discharge DX:	Back pain  Secondary Diagnosis:	Lightheadedness  Secondary Diagnosis:	Tinnitus

## 2020-03-10 NOTE — ED PROVIDER NOTE - OBJECTIVE STATEMENT
Pt is a 33 y/o F nonsmoker PMHx Asthma, scoliosis surgery, cholecystectomy p/w back pain x 1.5 hours.  Pt states 1.5 hours ago, upon waking up she experienced sudden onset right sided upper back pain, described as a "spasm" moderate in intensity radiating to posterior aspect of right upper arm.  Pt states pain is identical to "back spasms" that she has had in the past.  Pt states upon standing up from bed, she experienced lightheadedness and tinnitus in bilateral ears, which gradually resolved upon sitting down on toilet.  She states lightheadedness and tinnitus had since resolved and has not recurred.  Presently, pt notes aching pain at right upper back, 4/10 in intensity which worsens to 8/10 with shrugging shoulders.  She states back pain typically resolves gradually on its own.  Pt denies any fevers, chills, nausea, vomiting, chest pain, SOB, neck pain/stiffness, dizziness, palpitations, visual disturbances, numbness, weakness, headache, head trauma, abdominal pain, calf pain/swelling, h/o dvt/pe in past, hemoptysis, hormonal replacement therapy, recent surgeries, recent prolonged immobilization, illicit drug use or any other specific complaints.

## 2020-03-10 NOTE — ED PROVIDER NOTE - PATIENT PORTAL LINK FT
You can access the FollowMyHealth Patient Portal offered by [facility] by registering at the following website: [email or web address]. By joining [employer]’s FollowMyHealth portal, you will also be able to view your health information using other applications (apps) compatible with our system.

## 2020-09-18 ENCOUNTER — APPOINTMENT (OUTPATIENT)
Dept: OBGYN | Facility: CLINIC | Age: 35
End: 2020-09-18
Payer: COMMERCIAL

## 2020-09-18 VITALS
DIASTOLIC BLOOD PRESSURE: 62 MMHG | BODY MASS INDEX: 26.81 KG/M2 | WEIGHT: 133 LBS | SYSTOLIC BLOOD PRESSURE: 98 MMHG | HEIGHT: 59 IN

## 2020-09-18 PROCEDURE — 99395 PREV VISIT EST AGE 18-39: CPT

## 2021-03-19 ENCOUNTER — APPOINTMENT (OUTPATIENT)
Dept: INTERNAL MEDICINE | Facility: CLINIC | Age: 36
End: 2021-03-19
Payer: COMMERCIAL

## 2021-03-19 VITALS
SYSTOLIC BLOOD PRESSURE: 110 MMHG | HEIGHT: 59 IN | OXYGEN SATURATION: 97 % | DIASTOLIC BLOOD PRESSURE: 60 MMHG | TEMPERATURE: 98.4 F | WEIGHT: 134 LBS | BODY MASS INDEX: 27.01 KG/M2 | HEART RATE: 88 BPM

## 2021-03-19 DIAGNOSIS — Z82.49 FAMILY HISTORY OF ISCHEMIC HEART DISEASE AND OTHER DISEASES OF THE CIRCULATORY SYSTEM: ICD-10-CM

## 2021-03-19 DIAGNOSIS — Z83.49 FAMILY HISTORY OF OTHER ENDOCRINE, NUTRITIONAL AND METABOLIC DISEASES: ICD-10-CM

## 2021-03-19 DIAGNOSIS — Z87.39 PERSONAL HISTORY OF OTHER DISEASES OF THE MUSCULOSKELETAL SYSTEM AND CONNECTIVE TISSUE: ICD-10-CM

## 2021-03-19 DIAGNOSIS — Z80.3 FAMILY HISTORY OF MALIGNANT NEOPLASM OF BREAST: ICD-10-CM

## 2021-03-19 DIAGNOSIS — R23.8 OTHER SKIN CHANGES: ICD-10-CM

## 2021-03-19 DIAGNOSIS — Z09 ENCOUNTER FOR FOLLOW-UP EXAMINATION AFTER COMPLETED TREATMENT FOR CONDITIONS OTHER THAN MALIGNANT NEOPLASM: ICD-10-CM

## 2021-03-19 DIAGNOSIS — Z83.3 FAMILY HISTORY OF DIABETES MELLITUS: ICD-10-CM

## 2021-03-19 DIAGNOSIS — J45.909 UNSPECIFIED ASTHMA, UNCOMPLICATED: ICD-10-CM

## 2021-03-19 DIAGNOSIS — Z87.898 PERSONAL HISTORY OF OTHER SPECIFIED CONDITIONS: ICD-10-CM

## 2021-03-19 DIAGNOSIS — Z87.09 PERSONAL HISTORY OF OTHER DISEASES OF THE RESPIRATORY SYSTEM: ICD-10-CM

## 2021-03-19 DIAGNOSIS — Z84.2 FAMILY HISTORY OF OTHER DISEASES OF THE GENITOURINARY SYSTEM: ICD-10-CM

## 2021-03-19 DIAGNOSIS — Z3A.18 18 WEEKS GESTATION OF PREGNANCY: ICD-10-CM

## 2021-03-19 PROCEDURE — 36415 COLL VENOUS BLD VENIPUNCTURE: CPT

## 2021-03-19 PROCEDURE — 99072 ADDL SUPL MATRL&STAF TM PHE: CPT

## 2021-03-19 PROCEDURE — 99385 PREV VISIT NEW AGE 18-39: CPT | Mod: 25

## 2021-03-19 NOTE — HISTORY OF PRESENT ILLNESS
[FreeTextEntry1] : physical and meet new provider [de-identified] : Patient presents to establish care with new provider.  Patient without any complaints or concerns. \par \par PMH: scoliosis s/p back surgery at age 15 years, has 2 rods in spine, sometimes gets muscle spasm in her upper back and occasionally will have some back pain.  Asthma in childhood that is exacerbated by cold air, rarely uses inhaler.  Acne.  \par \par Last pap smear in 2020 normal. No STI's, had heavy bleeding with vaginal delivery of child.\par \par Surgical: gall bladder removal 2019.\par \par Family history: prostate cancer in her father who also has diabetes.  Mother with diabetes and 1st stage cervical cancer. Paternal GM with pancreatic cancer. Maternal Great Aunt with breast cancer.  Sister with endometriosis. Healthy child no brothers.\par \par SH:  mother of 1, Jehovah Witness, works in [employer].

## 2021-03-19 NOTE — REVIEW OF SYSTEMS
[Fever] : no fever [Chills] : no chills [Fatigue] : no fatigue [Recent Change In Weight] : ~T no recent weight change [Vision Problems] : no vision problems [Hearing Loss] : no hearing loss [Chest Pain] : no chest pain [Palpitations] : no palpitations [Lower Ext Edema] : no lower extremity edema [Shortness Of Breath] : no shortness of breath [Wheezing] : no wheezing [Cough] : no cough [Dyspnea on Exertion] : no dyspnea on exertion [Abdominal Pain] : no abdominal pain [Nausea] : no nausea [Constipation] : no constipation [Diarrhea] : diarrhea [Vomiting] : no vomiting [Melena] : no melena [Dysuria] : no dysuria [Frequency] : no frequency [Joint Pain] : no joint pain [Joint Swelling] : no joint swelling [Back Pain] : back pain [Itching] : Itching [Headache] : no headache [Anxiety] : no anxiety [Depression] : no depression [de-identified] : itching of hands when she goes outside sometimes

## 2021-03-19 NOTE — HEALTH RISK ASSESSMENT
[Very Good] : ~his/her~ current health as very good [Excellent] : ~his/her~  mood as  excellent [Patient reported PAP Smear was normal] : Patient reported PAP Smear was normal [None] : None [With Family] : lives with family [# of Members in Household ___] :  household currently consist of [unfilled] member(s) [Employed] : employed [] :  [# Of Children ___] : has [unfilled] children [Sexually Active] : sexually active [High Risk Behavior] : no high risk behavior [Feels Safe at Home] : Feels safe at home [Reports changes in hearing] : Reports no changes in hearing [Reports changes in vision] : Reports no changes in vision [Reports changes in dental health] : Reports no changes in dental health [Smoke Detector] : smoke detector [Carbon Monoxide Detector] : carbon monoxide detector [Guns at Home] : no guns at home [Seat Belt] :  uses seat belt [PapSmearDate] : 09/20 [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

## 2021-03-19 NOTE — COUNSELING
[Encouraged to maintain food diary] : Encouraged to maintain food diary [Encouraged to increase physical activity] : Encouraged to increase physical activity [Encouraged to use exercise tracking device] : Encouraged to use exercise tracking device [Weigh Self Weekly] : weigh self weekly [Keep Food Diary] : keep food diary

## 2021-03-19 NOTE — PHYSICAL EXAM
[No Acute Distress] : no acute distress [Well-Appearing] : well-appearing [Normal Voice/Communication] : normal voice/communication [Normal Sclera/Conjunctiva] : normal sclera/conjunctiva [Normal TMs] : both tympanic membranes were normal [No Lymphadenopathy] : no lymphadenopathy [Thyroid Normal, No Nodules] : the thyroid was normal and there were no nodules present [No Respiratory Distress] : no respiratory distress  [Clear to Auscultation] : lungs were clear to auscultation bilaterally [Normal Rate] : normal rate  [Regular Rhythm] : with a regular rhythm [Normal S1, S2] : normal S1 and S2 [No Murmur] : no murmur heard [No Abdominal Bruit] : a ~M bruit was not heard ~T in the abdomen [No Varicosities] : no varicosities [No Edema] : there was no peripheral edema [Normal Appearance] : normal in appearance [No Nipple Discharge] : no nipple discharge [No Axillary Lymphadenopathy] : no axillary lymphadenopathy [Soft] : abdomen soft [Non Tender] : non-tender [Non-distended] : non-distended [No Masses] : no abdominal mass palpated [No HSM] : no HSM [Normal Bowel Sounds] : normal bowel sounds [No Hernias] : no hernias [Normal Supraclavicular Nodes] : no supraclavicular lymphadenopathy [Normal Axillary Nodes] : no axillary lymphadenopathy [Normal Posterior Cervical Nodes] : no posterior cervical lymphadenopathy [Normal Anterior Cervical Nodes] : no anterior cervical lymphadenopathy [No CVA Tenderness] : no CVA  tenderness [No Spinal Tenderness] : no spinal tenderness [Grossly Normal Strength/Tone] : grossly normal strength/tone [No Rash] : no rash [No Skin Lesions] : no skin lesions [No Focal Deficits] : no focal deficits [Deep Tendon Reflexes (DTR)] : deep tendon reflexes were 2+ and symmetric [Normal Affect] : the affect was normal [Alert and Oriented x3] : oriented to person, place, and time [Normal Mood] : the mood was normal [de-identified] : Offered chaperone for physical, patient accepted,  Yanira available for chaperoning per patient's request. [de-identified] : deferred up to date

## 2021-03-24 LAB
ALBUMIN SERPL ELPH-MCNC: 4.5 G/DL
ALP BLD-CCNC: 64 U/L
ALT SERPL-CCNC: 6 U/L
ANION GAP SERPL CALC-SCNC: 9 MMOL/L
AST SERPL-CCNC: 13 U/L
BASOPHILS # BLD AUTO: 0.03 K/UL
BASOPHILS NFR BLD AUTO: 0.7 %
BILIRUB SERPL-MCNC: 1 MG/DL
BUN SERPL-MCNC: 11 MG/DL
CALCIUM SERPL-MCNC: 9.3 MG/DL
CHLORIDE SERPL-SCNC: 105 MMOL/L
CHOLEST SERPL-MCNC: 165 MG/DL
CO2 SERPL-SCNC: 24 MMOL/L
COVID-19 NUCLEOCAPSID  GAM ANTIBODY INTERPRETATION: NEGATIVE
CREAT SERPL-MCNC: 0.81 MG/DL
EOSINOPHIL # BLD AUTO: 0.04 K/UL
EOSINOPHIL NFR BLD AUTO: 0.9 %
ESTIMATED AVERAGE GLUCOSE: 105 MG/DL
GLUCOSE SERPL-MCNC: 80 MG/DL
HBA1C MFR BLD HPLC: 5.3 %
HBV SURFACE AB SER QL: REACTIVE
HCT VFR BLD CALC: 43.2 %
HCV AB SER QL: NONREACTIVE
HCV S/CO RATIO: 0.09 S/CO
HDLC SERPL-MCNC: 66 MG/DL
HGB BLD-MCNC: 13.6 G/DL
HIV1+2 AB SPEC QL IA.RAPID: NONREACTIVE
IMM GRANULOCYTES NFR BLD AUTO: 0.2 %
LDLC SERPL CALC-MCNC: 87 MG/DL
LYMPHOCYTES # BLD AUTO: 1.55 K/UL
LYMPHOCYTES NFR BLD AUTO: 35.9 %
MAN DIFF?: NORMAL
MCHC RBC-ENTMCNC: 28.3 PG
MCHC RBC-ENTMCNC: 31.5 GM/DL
MCV RBC AUTO: 89.8 FL
MONOCYTES # BLD AUTO: 0.28 K/UL
MONOCYTES NFR BLD AUTO: 6.5 %
NEUTROPHILS # BLD AUTO: 2.41 K/UL
NEUTROPHILS NFR BLD AUTO: 55.8 %
NONHDLC SERPL-MCNC: 99 MG/DL
PLATELET # BLD AUTO: 242 K/UL
POTASSIUM SERPL-SCNC: 4.2 MMOL/L
PROT SERPL-MCNC: 7.2 G/DL
RBC # BLD: 4.81 M/UL
RBC # FLD: 13 %
SARS-COV-2 AB SERPL QL IA: 0.08 INDEX
SODIUM SERPL-SCNC: 138 MMOL/L
TRIGL SERPL-MCNC: 58 MG/DL
TSH SERPL-ACNC: 1.34 UIU/ML
WBC # FLD AUTO: 4.32 K/UL

## 2021-05-21 NOTE — ED PROVIDER NOTE - CLINICAL SUMMARY MEDICAL DECISION MAKING FREE TEXT BOX
yes Pt is a 35 y/o F nonsmoker PMHx Asthma, scoliosis surgery, cholecystectomy p/w back pain x 1.5 hours -- likely muscle spasm; not clinically concerning for spinal cord compression, lightheadedness likely 2/2 suddenly standing up -- lidocaine patch, NSAID, EKG

## 2021-06-08 ENCOUNTER — NON-APPOINTMENT (OUTPATIENT)
Age: 36
End: 2021-06-08

## 2021-06-11 ENCOUNTER — APPOINTMENT (OUTPATIENT)
Dept: INTERNAL MEDICINE | Facility: CLINIC | Age: 36
End: 2021-06-11
Payer: COMMERCIAL

## 2021-06-11 VITALS
TEMPERATURE: 98.2 F | HEIGHT: 59 IN | OXYGEN SATURATION: 98 % | HEART RATE: 87 BPM | SYSTOLIC BLOOD PRESSURE: 130 MMHG | BODY MASS INDEX: 26.61 KG/M2 | WEIGHT: 132 LBS | DIASTOLIC BLOOD PRESSURE: 70 MMHG

## 2021-06-11 DIAGNOSIS — M79.604 PAIN IN RIGHT LEG: ICD-10-CM

## 2021-06-11 DIAGNOSIS — M79.605 PAIN IN RIGHT LEG: ICD-10-CM

## 2021-06-11 PROCEDURE — 99072 ADDL SUPL MATRL&STAF TM PHE: CPT

## 2021-06-11 PROCEDURE — 99214 OFFICE O/P EST MOD 30 MIN: CPT

## 2021-06-12 ENCOUNTER — OUTPATIENT (OUTPATIENT)
Dept: OUTPATIENT SERVICES | Facility: HOSPITAL | Age: 36
LOS: 1 days | End: 2021-06-12
Payer: COMMERCIAL

## 2021-06-12 ENCOUNTER — APPOINTMENT (OUTPATIENT)
Dept: ULTRASOUND IMAGING | Facility: IMAGING CENTER | Age: 36
End: 2021-06-12
Payer: COMMERCIAL

## 2021-06-12 DIAGNOSIS — Z98.1 ARTHRODESIS STATUS: Chronic | ICD-10-CM

## 2021-06-12 DIAGNOSIS — Z00.8 ENCOUNTER FOR OTHER GENERAL EXAMINATION: ICD-10-CM

## 2021-06-12 PROCEDURE — 93970 EXTREMITY STUDY: CPT | Mod: 26

## 2021-06-12 PROCEDURE — 93970 EXTREMITY STUDY: CPT

## 2021-06-14 PROBLEM — M79.604 LEG PAIN, BILATERAL: Status: RESOLVED | Noted: 2021-06-11 | Resolved: 2021-06-17

## 2021-06-14 NOTE — PHYSICAL EXAM
[No Acute Distress] : no acute distress [Well-Appearing] : well-appearing [Normal Voice/Communication] : normal voice/communication [No Respiratory Distress] : no respiratory distress  [Clear to Auscultation] : lungs were clear to auscultation bilaterally [Pedal Pulses Present] : the pedal pulses are present [No Edema] : there was no peripheral edema [de-identified] : no cord palpable, negative Jovany's sign

## 2021-06-14 NOTE — REVIEW OF SYSTEMS
[Fever] : no fever [Chills] : no chills [Lower Ext Edema] : no lower extremity edema [Shortness Of Breath] : no shortness of breath [Dyspnea on Exertion] : no dyspnea on exertion [Muscle Pain] : muscle pain [FreeTextEntry9] : see HPI

## 2021-06-14 NOTE — PAST MEDICAL HISTORY
[FreeTextEntry1] : Reporting that she recently had increased bleeding since he IUD was placed a year ago.

## 2021-06-14 NOTE — HISTORY OF PRESENT ILLNESS
[FreeTextEntry8] : Patient presents with concerns over pain in both of her calves that started about a week ago.  She is having daily intermittent pain without any inciting factors lasting for about 30 minutes to an hour and goes away on its own.  She reports that her left leg worse than right leg, but occurs in both legs, 6/10 in pain intensity.  She has not attempted using any medications for relief.  She denies any recent falls, trauma or new exercises to explain the pain.  She is concerned as she had just received the second dose of Pfizer vaccine on the 1st of June.  She is wondering if it is a blood clot.\par \par No fever or chills, no swelling in the leg with exception left leg swelling spot, no palpitations, no problems breathing.

## 2021-07-20 ENCOUNTER — EMERGENCY (EMERGENCY)
Facility: HOSPITAL | Age: 36
LOS: 1 days | Discharge: HOME | End: 2021-07-20
Attending: EMERGENCY MEDICINE | Admitting: EMERGENCY MEDICINE
Payer: COMMERCIAL

## 2021-07-20 VITALS
HEIGHT: 59 IN | OXYGEN SATURATION: 100 % | HEART RATE: 93 BPM | TEMPERATURE: 98 F | SYSTOLIC BLOOD PRESSURE: 111 MMHG | RESPIRATION RATE: 18 BRPM | DIASTOLIC BLOOD PRESSURE: 75 MMHG

## 2021-07-20 DIAGNOSIS — Z98.1 ARTHRODESIS STATUS: Chronic | ICD-10-CM

## 2021-07-20 PROCEDURE — 73562 X-RAY EXAM OF KNEE 3: CPT | Mod: 26,RT

## 2021-07-20 PROCEDURE — 99284 EMERGENCY DEPT VISIT MOD MDM: CPT | Mod: 57

## 2021-07-20 PROCEDURE — 27560 TREAT KNEECAP DISLOCATION: CPT | Mod: 54

## 2021-07-20 RX ORDER — IBUPROFEN 200 MG
400 TABLET ORAL ONCE
Refills: 0 | Status: COMPLETED | OUTPATIENT
Start: 2021-07-20 | End: 2021-07-20

## 2021-07-20 RX ORDER — OXYCODONE AND ACETAMINOPHEN 5; 325 MG/1; MG/1
1 TABLET ORAL ONCE
Refills: 0 | Status: DISCONTINUED | OUTPATIENT
Start: 2021-07-20 | End: 2021-07-20

## 2021-07-20 RX ADMIN — OXYCODONE AND ACETAMINOPHEN 1 TABLET(S): 5; 325 TABLET ORAL at 23:21

## 2021-07-20 RX ADMIN — Medication 400 MILLIGRAM(S): at 23:21

## 2021-07-20 NOTE — ED ADULT TRIAGE NOTE - CHIEF COMPLAINT QUOTE
Pt reports to the ED for R knee pain. Pt reports pmh of dislocating R patella, feels similar. States her son was playing on her lap when knee twisted. increased pain when moving effected joint, + pulses in effected extremity.

## 2021-07-21 ENCOUNTER — NON-APPOINTMENT (OUTPATIENT)
Age: 36
End: 2021-07-21

## 2021-07-21 PROCEDURE — 73562 X-RAY EXAM OF KNEE 3: CPT | Mod: 26,RT

## 2021-07-21 NOTE — ED PROVIDER NOTE - PATIENT PORTAL LINK FT
You can access the FollowMyHealth Patient Portal offered by [facility] by registering at the following website: [email or web address]. By joining [employer]’s FollowMyHealth portal, you will also be able to view your health information using other applications (apps) compatible with our system.

## 2021-07-21 NOTE — ED PROVIDER NOTE - PMH
Asthma  controlled, on no med  Eczema    Gestational diabetes    Refusal of blood transfusions as patient is Gnosticist

## 2021-07-21 NOTE — ED PROVIDER NOTE - NSFOLLOWUPINSTRUCTIONS_ED_ALL_ED_FT
Wear your knee immobilizer for 1 week.  Follow up with your orthopedic surgeon for further surgical options

## 2021-08-08 ENCOUNTER — OUTPATIENT (OUTPATIENT)
Dept: OUTPATIENT SERVICES | Facility: HOSPITAL | Age: 36
LOS: 1 days | End: 2021-08-08
Payer: COMMERCIAL

## 2021-08-08 ENCOUNTER — APPOINTMENT (OUTPATIENT)
Dept: MRI IMAGING | Facility: CLINIC | Age: 36
End: 2021-08-08
Payer: COMMERCIAL

## 2021-08-08 DIAGNOSIS — Z98.1 ARTHRODESIS STATUS: Chronic | ICD-10-CM

## 2021-08-08 DIAGNOSIS — M22.11 RECURRENT SUBLUXATION OF PATELLA, RIGHT KNEE: ICD-10-CM

## 2021-08-08 DIAGNOSIS — Z00.8 ENCOUNTER FOR OTHER GENERAL EXAMINATION: ICD-10-CM

## 2021-08-08 PROCEDURE — 73721 MRI JNT OF LWR EXTRE W/O DYE: CPT | Mod: 26,RT

## 2021-08-08 PROCEDURE — 73721 MRI JNT OF LWR EXTRE W/O DYE: CPT

## 2021-08-11 ENCOUNTER — OUTPATIENT (OUTPATIENT)
Dept: OUTPATIENT SERVICES | Facility: HOSPITAL | Age: 36
LOS: 1 days | End: 2021-08-11
Payer: COMMERCIAL

## 2021-08-11 ENCOUNTER — APPOINTMENT (OUTPATIENT)
Dept: MRI IMAGING | Facility: IMAGING CENTER | Age: 36
End: 2021-08-11
Payer: COMMERCIAL

## 2021-08-11 DIAGNOSIS — M47.27 OTHER SPONDYLOSIS WITH RADICULOPATHY, LUMBOSACRAL REGION: ICD-10-CM

## 2021-08-11 DIAGNOSIS — Z00.8 ENCOUNTER FOR OTHER GENERAL EXAMINATION: ICD-10-CM

## 2021-08-11 DIAGNOSIS — Z98.1 ARTHRODESIS STATUS: Chronic | ICD-10-CM

## 2021-08-11 PROCEDURE — 72148 MRI LUMBAR SPINE W/O DYE: CPT | Mod: 26

## 2021-08-11 PROCEDURE — 72148 MRI LUMBAR SPINE W/O DYE: CPT

## 2021-10-06 PROBLEM — Z53.1 REFUSAL OF BLOOD TRANSFUSIONS AS PATIENT IS JEHOVAH'S WITNESS: Status: ACTIVE | Noted: 2018-05-17

## 2021-12-02 DIAGNOSIS — M62.08 SEPARATION OF MUSCLE (NONTRAUMATIC), OTHER SITE: ICD-10-CM

## 2021-12-02 DIAGNOSIS — Z98.890 OTHER SPECIFIED POSTPROCEDURAL STATES: ICD-10-CM

## 2022-01-01 NOTE — ED PROVIDER NOTE - RECEIVING PHYSICIAN
Airway  Performed by: Chiki Gr MD  Authorized by: Chiki Gr MD     Final Airway Type:  Supraglottic airway  Final Supraglottic Airway:  Unique  SGA Size*:  1.5  Attempts*:  1  Ventilation Between Attempts:  Supraglottic airway   Patient Identified, Procedure confirmed, Emergency equipment available and Safety protocols followed  Location:  OR  Urgency:  Elective  Difficult Airway: No    Indications for Airway Management:  Anesthesia  Spontaneous Ventilation: present    Sedation Level:  Deep  MILS Maintained Throughout: No    Mask Difficulty Assessment:  2 - vent by mask + OA or adjuvant +/- NMBA  Performed By:  Anesthesiologist  Anesthesiologist:  Chiki Gr MD  Start Time: 2022 11:46 AM    
Peripheral IV  Performed by: Chiki Gr MD  Authorized by: Chiki Gr MD     Size:  24 G  Laterality:  Left  Location:  Foot  Site Prep:  Alcohol  Technique:  Anatomical landmarks: permanent recording and image saved  Attempts:  1  Securement: Stabilization device, Tape and Transparent dressing  Performed By:  Anesthesiologist  Anesthesiologist:  Dottie Faust MD    
[facility]

## 2022-01-18 ENCOUNTER — APPOINTMENT (OUTPATIENT)
Dept: OBGYN | Facility: CLINIC | Age: 37
End: 2022-01-18
Payer: COMMERCIAL

## 2022-01-18 VITALS
DIASTOLIC BLOOD PRESSURE: 74 MMHG | BODY MASS INDEX: 26.81 KG/M2 | WEIGHT: 133 LBS | SYSTOLIC BLOOD PRESSURE: 116 MMHG | HEIGHT: 59 IN

## 2022-01-18 PROCEDURE — 99395 PREV VISIT EST AGE 18-39: CPT

## 2022-01-18 NOTE — HISTORY OF PRESENT ILLNESS
[FreeTextEntry1] : ERICA GOLDSTEIN is a 36 year old  LMP 2021 who presents for annual wellness visit. Pt was last seen in Sep 2020 with a negative PAP and HPV.  Pt was attending pelvic floor physical therapy for diastasis recti and has since been discharged. She had a Mirena IUD inserted 2019. Pt reports that she still has her period and it is painful.  No plans for pregnancy at this time.  She reports that after she got the COVID-19 booster, her period was extremely painful and heavy. \par \par Pt refuses to accept blood products. \par \par OBHx:\par -  x1 2018 [TextBox_4] : Annual [LMPDate] : 12/20/22

## 2022-01-18 NOTE — PHYSICAL EXAM
[Appropriately responsive] : appropriately responsive [Alert] : alert [No Acute Distress] : no acute distress [Soft] : soft [Non-tender] : non-tender [Non-distended] : non-distended [No HSM] : No HSM [No Lesions] : no lesions [No Mass] : no mass [Oriented x3] : oriented x3 [Examination Of The Breasts] : a normal appearance [No Masses] : no breast masses were palpable [Labia Majora] : normal [Labia Minora] : normal [Normal] : normal [Uterine Adnexae] : normal [IUD String] : an IUD string was noted

## 2022-01-19 LAB — HPV HIGH+LOW RISK DNA PNL CVX: NOT DETECTED

## 2022-01-22 LAB — CYTOLOGY CVX/VAG DOC THIN PREP: NORMAL

## 2022-04-06 ENCOUNTER — APPOINTMENT (OUTPATIENT)
Dept: ALLERGY | Facility: CLINIC | Age: 37
End: 2022-04-06
Payer: COMMERCIAL

## 2022-04-06 VITALS — SYSTOLIC BLOOD PRESSURE: 120 MMHG | OXYGEN SATURATION: 98 % | DIASTOLIC BLOOD PRESSURE: 72 MMHG | HEART RATE: 94 BPM

## 2022-04-06 PROCEDURE — 99204 OFFICE O/P NEW MOD 45 MIN: CPT | Mod: 25

## 2022-04-06 PROCEDURE — 95004 PERQ TESTS W/ALRGNC XTRCS: CPT

## 2022-04-06 PROCEDURE — 95018 ALL TSTG PERQ&IQ DRUGS/BIOL: CPT

## 2022-04-06 RX ORDER — TRIAMCINOLONE ACETONIDE 1 MG/G
0.1 CREAM TOPICAL
Qty: 1 | Refills: 0 | Status: DISCONTINUED | COMMUNITY
Start: 2019-06-21 | End: 2022-04-06

## 2022-04-06 RX ORDER — SPIRONOLACTONE 50 MG/1
50 TABLET ORAL
Qty: 90 | Refills: 1 | Status: DISCONTINUED | COMMUNITY
Start: 2019-08-23 | End: 2022-04-06

## 2022-04-06 RX ORDER — TRETINOIN 0.25 MG/G
0.03 CREAM TOPICAL
Qty: 1 | Refills: 1 | Status: DISCONTINUED | COMMUNITY
Start: 2019-06-21 | End: 2022-04-06

## 2022-04-06 RX ORDER — ALBUTEROL SULFATE 90 UG/1
INHALANT RESPIRATORY (INHALATION)
Refills: 0 | Status: DISCONTINUED | COMMUNITY
End: 2022-04-06

## 2022-04-06 NOTE — IMPRESSION
[Allergy Testing Cockroach] : cockroach [Allergy Testing Dog] : dog [Allergy Testing Cat] : cat [] : molds [Allergy Testing Trees] : trees [Allergy Testing Weeds] : weeds [Allergy Testing Grasses] : grasses [Allergy Testing Dust Mite] : dust mites

## 2022-04-19 NOTE — ASSESSMENT
[FreeTextEntry1] : Episodic hand/foot angioedema:\par \par Allegra 180 mg BID for next episode of angioedema\par Patient instructed to take photos if she has another episode of swelling\par \par Possible PN/TN allergy:\par \par Immuno CAP to PN/TN\par EpiPen renewed.   Patient instructed on the proper use of an EpiPen - precautions - follow up after use of EpiPen - need to have device available at all times\par Patient instructed to have Benadryl, in addition to EpiPen, available at all times - reviewed indications for use of Benadryl - dosing - precautions and follow up.

## 2022-04-19 NOTE — CONSULT LETTER
[Dear  ___] : Dear  [unfilled], [Thank you for referring [unfilled] for consultation for _____] : Thank you for referring [unfilled] for consultation for [unfilled] [Please see my note below.] : Please see my note below. [Sincerely,] : Sincerely, [FreeTextEntry3] : Mitchell B. Boxer, M.D., FAAAAI\par [facility]\par \par Department of Allergy-Immunology\par [facility] \par [address] [place] [address]\par [place] [address]\par Tel:   [phone]\par Fax:  [phone]\par Email: [email or web address]\par

## 2022-04-19 NOTE — HISTORY OF PRESENT ILLNESS
[Eczematous rashes] : eczematous rashes [Venom Reactions] : venom reactions [de-identified] : Patient reports a long history of episodic hand swelling with itching and occasionally her feet.   These symptoms occur about every few months.    She will take no medications for these symptoms.   Unrelated to eating and exercise. \par \par Peanut - age 20s - loss of voice - itching ears and throat - Benadryl - she avoids PN and TN - she has eaten almond with no reaction. \par \par Mild intermittent asthma with rescue inhaler prn - she uses less than 1x per month

## 2022-04-19 NOTE — PHYSICAL EXAM
[Alert] : alert [Well Nourished] : well nourished [Healthy Appearance] : healthy appearance [No Acute Distress] : no acute distress [Well Developed] : well developed [Normal Voice/Communication] : normal voice communication [No Neck Mass] : no neck mass was observed [No LAD] : no lymphadenopathy [Normal Rate and Effort] : normal respiratory rhythm and effort [No Crackles] : no crackles [No Retractions] : no retractions [Bilateral Audible Breath Sounds] : bilateral audible breath sounds [Normal Rate] : heart rate was normal  [Normal S1, S2] : normal S1 and S2 [No murmur] : no murmur [Regular Rhythm] : with a regular rhythm [Normal Cervical Lymph Nodes] : cervical [Skin Intact] : skin intact  [No Rash] : no rash [Normal Mood] : mood was normal [Normal Affect] : affect was normal [Judgment and Insight Age Appropriate] : judgement and insight is age appropriate [Alert, Awake, Oriented as Age-Appropriate] : alert, awake, oriented as age appropriate [Wheezing] : no wheezing was heard

## 2022-04-19 NOTE — SOCIAL HISTORY
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

## 2022-04-25 ENCOUNTER — TRANSCRIPTION ENCOUNTER (OUTPATIENT)
Age: 37
End: 2022-04-25

## 2022-05-06 ENCOUNTER — APPOINTMENT (OUTPATIENT)
Dept: INTERNAL MEDICINE | Facility: CLINIC | Age: 37
End: 2022-05-06
Payer: COMMERCIAL

## 2022-05-06 ENCOUNTER — LABORATORY RESULT (OUTPATIENT)
Age: 37
End: 2022-05-06

## 2022-05-06 VITALS
HEIGHT: 59 IN | BODY MASS INDEX: 27.62 KG/M2 | OXYGEN SATURATION: 92 % | WEIGHT: 137 LBS | DIASTOLIC BLOOD PRESSURE: 73 MMHG | SYSTOLIC BLOOD PRESSURE: 110 MMHG | HEART RATE: 87 BPM

## 2022-05-06 DIAGNOSIS — Z11.51 ENCOUNTER FOR SCREENING FOR HUMAN PAPILLOMAVIRUS (HPV): ICD-10-CM

## 2022-05-06 PROCEDURE — 99395 PREV VISIT EST AGE 18-39: CPT | Mod: 25

## 2022-05-06 PROCEDURE — G0444 DEPRESSION SCREEN ANNUAL: CPT | Mod: 59

## 2022-05-09 ENCOUNTER — APPOINTMENT (OUTPATIENT)
Dept: INTERNAL MEDICINE | Facility: CLINIC | Age: 37
End: 2022-05-09

## 2022-05-10 LAB
ALMOND IGE QN: <0.1 KUA/L
BRAZIL NUT IGE QN: <0.1 KUA/L
CASHEW NUT IGE QN: <0.1 KUA/L
DEPRECATED ALMOND IGE RAST QL: 0
DEPRECATED BRAZIL NUT IGE RAST QL: 0
DEPRECATED CASHEW NUT IGE RAST QL: 0
DEPRECATED HAZELNUT IGE RAST QL: 0
DEPRECATED PEANUT IGE RAST QL: 0
DEPRECATED PECAN/HICK TREE IGE RAST QL: 0
DEPRECATED PINE NUT IGE RAST QL: 0
DEPRECATED PISTACHIO IGE RAST QL: <0.1 KUA/L
DEPRECATED WALNUT IGE RAST QL: 0
HAZELNUT IGE QN: <0.1 KUA/L
PEANUT (RARA H) 1 IGE QN: <0.1 KUA/L
PEANUT (RARA H) 2 IGE QN: <0.1 KUA/L
PEANUT (RARA H) 3 IGE QN: <0.1 KUA/L
PEANUT (RARA H) 6 IGE QN: <0.1 KUA/L
PEANUT (RARA H) 8 IGE QN: <0.1 KUA/L
PEANUT (RARA H) 9 IGE QN: <0.1 KUA/L
PEANUT IGE QN: <0.1 KUA/L
PECAN/HICK TREE IGE QN: <0.1 KUA/L
PINE NUT IGE QN: <0.1 KUA/L
PISTACHIO IGE QN: 0
R COR A1 PR-10 HAZELNUT (F428) CLASS: 0
R COR A1 PR-10 HAZELNUT (F428) CONC: <0.1 KUA/L
R COR A14 HAZELNUT (F439) CLASS: 0
R COR A14 HAZELNUT (F439) CONC: <0.1 KUA/L
R COR A8 LTP HAZELNUT (F425) CLASS: 0
R COR A8 LTP HAZELNUT (F425) CONC: <0.1 KUA/L
R COR A9 HAZELNUT (F440) CLASS: 0
R COR A9 HAZELNUT (F440) CONC: <0.1 KUA/L
R JUG R1 STORAGE PROTEIN WALNUT (F441) CLASS: 0
R JUG R1 STORAGE PROTEIN WALNUT (F441) CONC: <0.1 KUA/L
R JUG R3 LPT WALNUT (F442) CLASS: 0
R JUG R3 LPT WALNUT (F442) CONC: <0.1 KUA/L
RARA H 6 STORAGE PROTEIN (F447) CLASS: 0
RARA H1 STORAGE PROTEIN (F422) CLASS: 0
RARA H2 STORAGE PROTEIN (F423) CLASS: 0
RARA H3 STORAGE PROTEIN (F424) CLASS: 0
RARA H8 PR-10 PROTEIN (F352) CLASS: 0
RARA H9 LIPID TRANSFERTP (F427) CLASS: 0
WALNUT IGE QN: <0.1 KUA/L

## 2022-05-11 ENCOUNTER — NON-APPOINTMENT (OUTPATIENT)
Age: 37
End: 2022-05-11

## 2022-05-12 PROBLEM — Z11.51 ENCOUNTER FOR SCREENING FOR HUMAN PAPILLOMAVIRUS (HPV): Status: RESOLVED | Noted: 2022-01-18 | Resolved: 2022-05-12

## 2022-05-12 NOTE — HISTORY OF PRESENT ILLNESS
[FreeTextEntry1] : annual check up [de-identified] : Here for her annual check up.  Up to date with routine pap smear. Recently seen by Allergist.

## 2022-05-12 NOTE — ASSESSMENT
[FreeTextEntry1] : Assessment as indicated above in impressions with plans through orders below. Refilled annual medications so that patient has updated prescription on file at pharmacy.

## 2022-05-12 NOTE — HEALTH RISK ASSESSMENT
[Never] : Never [Yes] : Yes [Monthly or less (1 pt)] : Monthly or less (1 point) [1 or 2 (0 pts)] : 1 or 2 (0 points) [Never (0 pts)] : Never (0 points) [No] : In the past 12 months have you used drugs other than those required for medical reasons? No [0] : 2) Feeling down, depressed, or hopeless: Not at all (0) [PHQ-2 Negative - No further assessment needed] : PHQ-2 Negative - No further assessment needed [] :  [Feels Safe at Home] : Feels safe at home [Smoke Detector] : smoke detector [Carbon Monoxide Detector] : carbon monoxide detector [Seat Belt] :  uses seat belt [[id number]] : 0 [Reports changes in hearing] : Reports no changes in hearing [Reports changes in vision] : Reports no changes in vision [Reports changes in dental health] : Reports no changes in dental health

## 2022-05-12 NOTE — COUNSELING
[Encouraged to maintain food diary] : Encouraged to maintain food diary [Encouraged to use exercise tracking device] : Encouraged to use exercise tracking device

## 2022-05-12 NOTE — PHYSICAL EXAM
[Normal Sclera/Conjunctiva] : normal sclera/conjunctiva [Normal TMs] : both tympanic membranes were normal [No Edema] : there was no peripheral edema [No Skin Lesions] : no skin lesions [Deep Tendon Reflexes (DTR)] : deep tendon reflexes were 2+ and symmetric [Normal] : affect was normal and insight and judgment were intact

## 2022-05-25 ENCOUNTER — APPOINTMENT (OUTPATIENT)
Dept: ALLERGY | Facility: CLINIC | Age: 37
End: 2022-05-25
Payer: COMMERCIAL

## 2022-05-25 VITALS
RESPIRATION RATE: 15 BRPM | OXYGEN SATURATION: 99 % | HEART RATE: 89 BPM | DIASTOLIC BLOOD PRESSURE: 70 MMHG | SYSTOLIC BLOOD PRESSURE: 110 MMHG | TEMPERATURE: 98.5 F

## 2022-05-25 PROCEDURE — 95004 PERQ TESTS W/ALRGNC XTRCS: CPT

## 2022-05-25 PROCEDURE — 95018 ALL TSTG PERQ&IQ DRUGS/BIOL: CPT

## 2022-05-25 PROCEDURE — 99213 OFFICE O/P EST LOW 20 MIN: CPT | Mod: 25

## 2022-05-25 NOTE — SOCIAL HISTORY
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

## 2022-05-25 NOTE — ASSESSMENT
[FreeTextEntry1] : Patient with negative skin testing to PN/TN - I have discussed the option of oral PN challenge in the office - she can introduce TN on her own.

## 2022-08-03 ENCOUNTER — APPOINTMENT (OUTPATIENT)
Dept: ALLERGY | Facility: CLINIC | Age: 37
End: 2022-08-03

## 2022-08-03 PROCEDURE — 95076 INGEST CHALLENGE INI 120 MIN: CPT

## 2022-08-03 PROCEDURE — 99213 OFFICE O/P EST LOW 20 MIN: CPT | Mod: 25

## 2022-08-03 NOTE — HISTORY OF PRESENT ILLNESS
[de-identified] : Patient being seen today for an oral PN challenge.   Patient brought in photo of swollen hands and redness of her hands - resolved in 15 minutes she did not take Allegra

## 2022-08-03 NOTE — ASSESSMENT
[FreeTextEntry1] : Patient tolerated her oral PN challenge - she can eats PN and she can RV for TN challenge in the future \par \par Angioedema of hands - trial of Allegra 180 mg QD for next episode of swelling.

## 2022-08-03 NOTE — SOCIAL HISTORY
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

## 2022-08-03 NOTE — PHYSICAL EXAM
[Alert] : alert [Well Nourished] : well nourished [Healthy Appearance] : healthy appearance [No Acute Distress] : no acute distress [Well Developed] : well developed [Normal Voice/Communication] : normal voice communication [No Neck Mass] : no neck mass was observed [No LAD] : no lymphadenopathy [Normal Rate and Effort] : normal respiratory rhythm and effort [No Crackles] : no crackles [No Retractions] : no retractions [Bilateral Audible Breath Sounds] : bilateral audible breath sounds [Wheezing] : no wheezing was heard [Normal Rate] : heart rate was normal  [Normal S1, S2] : normal S1 and S2 [No murmur] : no murmur [Regular Rhythm] : with a regular rhythm [Normal Cervical Lymph Nodes] : cervical [Skin Intact] : skin intact  [No Rash] : no rash [Normal Mood] : mood was normal [Normal Affect] : affect was normal [Judgment and Insight Age Appropriate] : judgement and insight is age appropriate [Alert, Awake, Oriented as Age-Appropriate] : alert, awake, oriented as age appropriate

## 2022-09-26 ENCOUNTER — TRANSCRIPTION ENCOUNTER (OUTPATIENT)
Age: 37
End: 2022-09-26

## 2022-09-27 ENCOUNTER — APPOINTMENT (OUTPATIENT)
Dept: INTERNAL MEDICINE | Facility: CLINIC | Age: 37
End: 2022-09-27

## 2022-09-27 VITALS
DIASTOLIC BLOOD PRESSURE: 78 MMHG | OXYGEN SATURATION: 98 % | BODY MASS INDEX: 27.82 KG/M2 | WEIGHT: 138 LBS | HEART RATE: 101 BPM | HEIGHT: 59 IN | SYSTOLIC BLOOD PRESSURE: 112 MMHG | TEMPERATURE: 98 F

## 2022-09-27 DIAGNOSIS — H10.029 OTHER MUCOPURULENT CONJUNCTIVITIS, UNSPECIFIED EYE: ICD-10-CM

## 2022-09-27 PROCEDURE — 99213 OFFICE O/P EST LOW 20 MIN: CPT

## 2022-09-29 PROBLEM — H10.029 PINK EYE: Status: RESOLVED | Noted: 2022-09-29 | Resolved: 2022-10-02

## 2022-09-29 NOTE — PHYSICAL EXAM
[Normal Sclera/Conjunctiva] : normal sclera/conjunctiva [Normal] : normal sclera/conjunctiva, pupils are equal, round and reactive to light and extraocular movements are intact [de-identified] : no discharge noted, no swelling of eyelids

## 2022-09-29 NOTE — HEALTH RISK ASSESSMENT
[PHQ-2 Negative - No further assessment needed] : PHQ-2 Negative - No further assessment needed [RBM1Uetdt] : 0

## 2022-09-29 NOTE — HISTORY OF PRESENT ILLNESS
[FreeTextEntry8] : Patient presents for evaluation of pink eye.\par \par A month ago, had a viral URI, seen in Urgent care and given antibiotics for her pink eye. Some time after she got Covid.  Ever since she continues to have occasional cough, says it is dry and is started with a tickle at times.  She does say she continues to feel fatigued, sometimes dizzy and with headaches. \par \par Yesterday morning she woke up with her eyes red and gunky.  She was worried that the pink eye returned, she denies it being itchy.  She denies any sore throat or ear pains.  No fever or chills.  She does have some nasal congestion and feels postnasal drip.  Eyes are not gritty, there is no ongoing discharge except for that morning. No burning sensation.  Attempted antibiotic drops left over from before and wants another prescription.  \par

## 2022-10-07 NOTE — ED PROCEDURE NOTE - CPROC ED POST RADIOGRAPHY1
post procedure radiography not performed MEDICATIONS  (PRN):  clonazePAM  Tablet 0.25 milliGRAM(s) Oral daily PRN Anxiety

## 2022-10-12 ENCOUNTER — TRANSCRIPTION ENCOUNTER (OUTPATIENT)
Age: 37
End: 2022-10-12

## 2022-10-14 ENCOUNTER — APPOINTMENT (OUTPATIENT)
Dept: INTERNAL MEDICINE | Facility: CLINIC | Age: 37
End: 2022-10-14

## 2022-10-14 ENCOUNTER — LABORATORY RESULT (OUTPATIENT)
Age: 37
End: 2022-10-14

## 2022-10-14 VITALS
DIASTOLIC BLOOD PRESSURE: 82 MMHG | OXYGEN SATURATION: 98 % | SYSTOLIC BLOOD PRESSURE: 117 MMHG | HEIGHT: 59 IN | HEART RATE: 83 BPM | WEIGHT: 138 LBS | TEMPERATURE: 97.4 F | BODY MASS INDEX: 27.82 KG/M2

## 2022-10-14 DIAGNOSIS — Z86.32 PERSONAL HISTORY OF GESTATIONAL DIABETES: ICD-10-CM

## 2022-10-14 PROCEDURE — 36415 COLL VENOUS BLD VENIPUNCTURE: CPT

## 2022-10-14 PROCEDURE — 99213 OFFICE O/P EST LOW 20 MIN: CPT | Mod: 25

## 2022-10-17 LAB
ALBUMIN SERPL ELPH-MCNC: 4.4 G/DL
ALP BLD-CCNC: 68 U/L
ALT SERPL-CCNC: 13 U/L
ANION GAP SERPL CALC-SCNC: 11 MMOL/L
AST SERPL-CCNC: 16 U/L
BASOPHILS # BLD AUTO: 0.06 K/UL
BASOPHILS NFR BLD AUTO: 1.8 %
BILIRUB SERPL-MCNC: 0.4 MG/DL
BUN SERPL-MCNC: 7 MG/DL
CALCIUM SERPL-MCNC: 9.1 MG/DL
CHLORIDE SERPL-SCNC: 108 MMOL/L
CO2 SERPL-SCNC: 23 MMOL/L
CREAT SERPL-MCNC: 0.71 MG/DL
EGFR: 114 ML/MIN/1.73M2
EOSINOPHIL # BLD AUTO: 0.03 K/UL
EOSINOPHIL NFR BLD AUTO: 0.9 %
GLUCOSE SERPL-MCNC: 77 MG/DL
HCT VFR BLD CALC: 42.2 %
HGB BLD-MCNC: 13.3 G/DL
HIV1+2 AB SPEC QL IA.RAPID: NONREACTIVE
LYMPHOCYTES # BLD AUTO: 1.65 K/UL
LYMPHOCYTES NFR BLD AUTO: 52.2 %
MAN DIFF?: NORMAL
MCHC RBC-ENTMCNC: 27.5 PG
MCHC RBC-ENTMCNC: 31.5 GM/DL
MCV RBC AUTO: 87.2 FL
MONOCYTES # BLD AUTO: 0.22 K/UL
MONOCYTES NFR BLD AUTO: 7.1 %
NEUTROPHILS # BLD AUTO: 1.12 K/UL
NEUTROPHILS NFR BLD AUTO: 35.4 %
PLATELET # BLD AUTO: 250 K/UL
POTASSIUM SERPL-SCNC: 4.9 MMOL/L
PROT SERPL-MCNC: 7.2 G/DL
RBC # BLD: 4.84 M/UL
RBC # FLD: 13.7 %
SODIUM SERPL-SCNC: 142 MMOL/L
VIT B12 SERPL-MCNC: 727 PG/ML
WBC # FLD AUTO: 3.16 K/UL

## 2022-10-20 PROBLEM — Z86.32 HISTORY OF GESTATIONAL DIABETES MELLITUS (GDM): Status: RESOLVED | Noted: 2018-08-14 | Resolved: 2022-09-29

## 2022-10-20 NOTE — HISTORY OF PRESENT ILLNESS
[FreeTextEntry1] : Worried has an immune system problem [de-identified] : Patient presents stating she wants to get tested to see if she has an immune system problem.  She reports getting sick back to back since July, fevers with a real bad cold, had sinus infection and colds in August.  Covid in September.  \par \par At home she has 1 child 3 years old, but reports that the child has not been in a  or school setting, for her to have gotten exposed.

## 2022-10-24 ENCOUNTER — TRANSCRIPTION ENCOUNTER (OUTPATIENT)
Age: 37
End: 2022-10-24

## 2022-11-18 ENCOUNTER — APPOINTMENT (OUTPATIENT)
Dept: INTERNAL MEDICINE | Facility: CLINIC | Age: 37
End: 2022-11-18

## 2022-12-13 ENCOUNTER — TRANSCRIPTION ENCOUNTER (OUTPATIENT)
Age: 37
End: 2022-12-13

## 2022-12-20 ENCOUNTER — APPOINTMENT (OUTPATIENT)
Dept: INTERNAL MEDICINE | Facility: CLINIC | Age: 37
End: 2022-12-20

## 2022-12-20 VITALS
WEIGHT: 133 LBS | BODY MASS INDEX: 26.81 KG/M2 | HEIGHT: 59 IN | DIASTOLIC BLOOD PRESSURE: 66 MMHG | SYSTOLIC BLOOD PRESSURE: 109 MMHG | OXYGEN SATURATION: 98 % | HEART RATE: 83 BPM | TEMPERATURE: 97.9 F

## 2022-12-20 PROCEDURE — 36415 COLL VENOUS BLD VENIPUNCTURE: CPT

## 2022-12-20 PROCEDURE — 99213 OFFICE O/P EST LOW 20 MIN: CPT | Mod: 25

## 2022-12-21 LAB — TSH SERPL-ACNC: 1.33 UIU/ML

## 2022-12-28 NOTE — HISTORY OF PRESENT ILLNESS
[FreeTextEntry1] : white spots on nails [de-identified] : White spots on nails present for a few weeks, worried it is related to the hand, foot and mouth disease she developed.  Worried that her nails will fall off.  Doesn't really feel any pain, does notice some lifting of the nail.  Saw urgent care who didn't feel that it was a fungal infection.  Patient attempting nail polish for fungal infection left over from her toe infection some years back.\par

## 2022-12-28 NOTE — PHYSICAL EXAM
[Normal] : no acute distress, well nourished, well developed and well-appearing [de-identified] : nail bed with white lesions noted on some fingers.

## 2022-12-28 NOTE — REVIEW OF SYSTEMS
[Nail Changes] : nail changes [Fever] : no fever [Chills] : no chills [Itching] : no itching [Skin Rash] : no skin rash

## 2023-01-06 ENCOUNTER — APPOINTMENT (OUTPATIENT)
Dept: DERMATOLOGY | Facility: CLINIC | Age: 38
End: 2023-01-06
Payer: COMMERCIAL

## 2023-01-06 PROCEDURE — 99204 OFFICE O/P NEW MOD 45 MIN: CPT

## 2023-03-27 ENCOUNTER — NON-APPOINTMENT (OUTPATIENT)
Age: 38
End: 2023-03-27

## 2023-03-30 ENCOUNTER — APPOINTMENT (OUTPATIENT)
Dept: ORTHOPEDIC SURGERY | Facility: CLINIC | Age: 38
End: 2023-03-30
Payer: COMMERCIAL

## 2023-03-30 VITALS — HEIGHT: 59 IN | BODY MASS INDEX: 27.42 KG/M2 | WEIGHT: 136 LBS

## 2023-03-30 DIAGNOSIS — M25.561 PAIN IN RIGHT KNEE: ICD-10-CM

## 2023-03-30 PROCEDURE — 99204 OFFICE O/P NEW MOD 45 MIN: CPT

## 2023-03-30 PROCEDURE — 73562 X-RAY EXAM OF KNEE 3: CPT | Mod: RT

## 2023-03-31 PROBLEM — M25.561 RIGHT KNEE PAIN: Status: ACTIVE | Noted: 2023-03-31

## 2023-04-02 ENCOUNTER — APPOINTMENT (OUTPATIENT)
Dept: MRI IMAGING | Facility: IMAGING CENTER | Age: 38
End: 2023-04-02
Payer: COMMERCIAL

## 2023-04-02 ENCOUNTER — OUTPATIENT (OUTPATIENT)
Dept: OUTPATIENT SERVICES | Facility: HOSPITAL | Age: 38
LOS: 1 days | End: 2023-04-02
Payer: COMMERCIAL

## 2023-04-02 DIAGNOSIS — M25.461 EFFUSION, RIGHT KNEE: ICD-10-CM

## 2023-04-02 DIAGNOSIS — Z98.1 ARTHRODESIS STATUS: Chronic | ICD-10-CM

## 2023-04-02 PROCEDURE — 73721 MRI JNT OF LWR EXTRE W/O DYE: CPT | Mod: 26,RT

## 2023-04-02 PROCEDURE — 73721 MRI JNT OF LWR EXTRE W/O DYE: CPT

## 2023-04-07 ENCOUNTER — APPOINTMENT (OUTPATIENT)
Dept: DERMATOLOGY | Facility: CLINIC | Age: 38
End: 2023-04-07

## 2023-04-13 ENCOUNTER — APPOINTMENT (OUTPATIENT)
Dept: ORTHOPEDIC SURGERY | Facility: CLINIC | Age: 38
End: 2023-04-13
Payer: COMMERCIAL

## 2023-04-13 DIAGNOSIS — M17.11 UNILATERAL PRIMARY OSTEOARTHRITIS, RIGHT KNEE: ICD-10-CM

## 2023-04-13 PROCEDURE — 99214 OFFICE O/P EST MOD 30 MIN: CPT | Mod: 25

## 2023-04-13 PROCEDURE — 20610 DRAIN/INJ JOINT/BURSA W/O US: CPT | Mod: RT

## 2023-04-27 ENCOUNTER — APPOINTMENT (OUTPATIENT)
Dept: OBGYN | Facility: CLINIC | Age: 38
End: 2023-04-27
Payer: COMMERCIAL

## 2023-04-27 VITALS — WEIGHT: 136 LBS | SYSTOLIC BLOOD PRESSURE: 116 MMHG | BODY MASS INDEX: 27.47 KG/M2 | DIASTOLIC BLOOD PRESSURE: 86 MMHG

## 2023-04-27 PROCEDURE — 99395 PREV VISIT EST AGE 18-39: CPT | Mod: 25

## 2023-04-27 PROCEDURE — 58301 REMOVE INTRAUTERINE DEVICE: CPT

## 2023-04-28 NOTE — PLAN
[FreeTextEntry1] : 36 y/o presents for annual and IUD removal. \par \par -PAP UTD\par -advised PNVs\par -suggested TXA + Naproxen DS to treat menstrual symptoms\par -f/u in 1 year

## 2023-04-28 NOTE — PROCEDURE
[IUD Removal] : intrauterine device (IUD) removal [Time out performed] : Pre-procedure time out performed.  Patient's name, date of birth and procedure confirmed. [Consent Obtained] : Consent obtained [Risks] : risks [Benefits] : benefits [Alternatives] : alternatives [Patient] : patient [IUD Discarded] : IUD discarded [Sent to Pathology] : specimen was placed in buffered formalin and sent for pathology [Tolerated Well] : Patient tolerated the procedure well [No Complications] : no complications [Heavy Vaginal Bleeding] : for heavy vaginal bleeding [Pelvic Pain] : for pelvic pain [Fertility Desired] : fertility desired [Nonvisualization of Strings] : nonvisualization of strings [de-identified] : strings exposed with notched IUD hook

## 2023-04-28 NOTE — END OF VISIT
[FreeTextEntry2] : I, Katherine Hicks, acted as a scribe on behalf of Dr. Jelena Jordan on 04/27/2023 .\par \par All medical entries made by the scribe were at my, Dr. Jelena Jordan, direction and personally dictated by me on 04/27/2023. I have reviewed the chart and agree that the record accurately reflects my personal performance of the history, physical exam, assessment and plan. I have also personally directed, reviewed, and agreed with the chart.\par

## 2023-04-28 NOTE — HISTORY OF PRESENT ILLNESS
[FreeTextEntry1] : 36 y/o  LMP 04/10/2023 presents for annual and IUD removal. Pt was last seen in 2022 for annual, negative PAP and HPV. She had a Mirena IUD inserted in 2019. She reports that after she got the COVID-19 booster, her period was extremely painful and heavy. She had one prior vaginal delivery in 2018. Her son is in pre-K. Her  wants a second child, but pt is unsure. She reports h/o heavy and painful menses.\par  [PapSmeardate] : 01/2022  [TextBox_31] : wnl

## 2023-08-03 ENCOUNTER — TRANSCRIPTION ENCOUNTER (OUTPATIENT)
Age: 38
End: 2023-08-03

## 2023-09-06 NOTE — ASSESSMENT
[FreeTextEntry1] : 32 yo female with symptomatic cholelithiasis. I recommended laparoscopic cholecystectomy, possible open, and discussed the details, risks, benefits and alternatives of the procedure and recovery expectations with the patient. \par She wishes to proceed and will schedule the surgery with my office.\par 
20

## 2023-09-11 ENCOUNTER — TRANSCRIPTION ENCOUNTER (OUTPATIENT)
Age: 38
End: 2023-09-11

## 2023-09-12 ENCOUNTER — TRANSCRIPTION ENCOUNTER (OUTPATIENT)
Age: 38
End: 2023-09-12

## 2023-09-12 DIAGNOSIS — Z30.41 ENCOUNTER FOR SURVEILLANCE OF CONTRACEPTIVE PILLS: ICD-10-CM

## 2023-09-18 ENCOUNTER — OUTPATIENT (OUTPATIENT)
Dept: OUTPATIENT SERVICES | Facility: HOSPITAL | Age: 38
LOS: 1 days | End: 2023-09-18

## 2023-09-18 ENCOUNTER — APPOINTMENT (OUTPATIENT)
Dept: INTERNAL MEDICINE | Facility: CLINIC | Age: 38
End: 2023-09-18
Payer: COMMERCIAL

## 2023-09-18 VITALS
OXYGEN SATURATION: 98 % | HEIGHT: 59 IN | DIASTOLIC BLOOD PRESSURE: 70 MMHG | BODY MASS INDEX: 27.62 KG/M2 | WEIGHT: 137 LBS | SYSTOLIC BLOOD PRESSURE: 110 MMHG | HEART RATE: 76 BPM | RESPIRATION RATE: 15 BRPM

## 2023-09-18 DIAGNOSIS — Z23 ENCOUNTER FOR IMMUNIZATION: ICD-10-CM

## 2023-09-18 DIAGNOSIS — T75.3XXA MOTION SICKNESS, INITIAL ENCOUNTER: ICD-10-CM

## 2023-09-18 DIAGNOSIS — N92.1 EXCESSIVE AND FREQUENT MENSTRUATION WITH IRREGULAR CYCLE: ICD-10-CM

## 2023-09-18 DIAGNOSIS — Z30.432 ENCOUNTER FOR REMOVAL OF INTRAUTERINE CONTRACEPTIVE DEVICE: ICD-10-CM

## 2023-09-18 DIAGNOSIS — Z97.5 EXCESSIVE AND FREQUENT MENSTRUATION WITH IRREGULAR CYCLE: ICD-10-CM

## 2023-09-18 DIAGNOSIS — L60.9 NAIL DISORDER, UNSPECIFIED: ICD-10-CM

## 2023-09-18 DIAGNOSIS — Z98.1 ARTHRODESIS STATUS: Chronic | ICD-10-CM

## 2023-09-18 DIAGNOSIS — Z87.898 PERSONAL HISTORY OF OTHER SPECIFIED CONDITIONS: ICD-10-CM

## 2023-09-18 DIAGNOSIS — B08.4 ENTEROVIRAL VESICULAR STOMATITIS WITH EXANTHEM: ICD-10-CM

## 2023-09-18 DIAGNOSIS — Z00.00 ENCOUNTER FOR GENERAL ADULT MEDICAL EXAMINATION W/OUT ABNORMAL FINDINGS: ICD-10-CM

## 2023-09-18 DIAGNOSIS — Z91.010 ALLERGY TO PEANUTS: ICD-10-CM

## 2023-09-18 DIAGNOSIS — G44.209 TENSION-TYPE HEADACHE, UNSPECIFIED, NOT INTRACTABLE: ICD-10-CM

## 2023-09-18 DIAGNOSIS — L60.8 OTHER NAIL DISORDERS: ICD-10-CM

## 2023-09-18 PROCEDURE — 99395 PREV VISIT EST AGE 18-39: CPT

## 2023-09-18 RX ORDER — LEVONORGESTREL 52 MG/1
20 INTRAUTERINE DEVICE INTRAUTERINE
Refills: 0 | Status: COMPLETED | COMMUNITY
End: 2023-05-01

## 2023-09-18 RX ORDER — ALBUTEROL SULFATE 90 UG/1
108 (90 BASE) INHALANT RESPIRATORY (INHALATION)
Qty: 1 | Refills: 11 | Status: ACTIVE | COMMUNITY
Start: 2018-03-16 | End: 1900-01-01

## 2023-09-18 RX ORDER — AMOXICILLIN 500 MG/1
500 CAPSULE ORAL
Qty: 20 | Refills: 0 | Status: COMPLETED | COMMUNITY
Start: 2023-08-03

## 2023-09-18 RX ORDER — SCOPOLAMINE 1.5 MG/1
1 PATCH, EXTENDED RELEASE TRANSDERMAL
Qty: 1 | Refills: 3 | Status: COMPLETED | COMMUNITY
Start: 2022-04-25 | End: 2023-09-30

## 2023-09-18 RX ORDER — EPINEPHRINE 0.3 MG/.3ML
0.3 INJECTION INTRAMUSCULAR
Qty: 1 | Refills: 11 | Status: ACTIVE | COMMUNITY
Start: 2021-03-19 | End: 1900-01-01

## 2023-09-26 PROBLEM — Z87.898 HISTORY OF EXCESSIVE SWEATING: Status: RESOLVED | Noted: 2021-03-19 | Resolved: 2023-09-26

## 2023-09-27 ENCOUNTER — TRANSCRIPTION ENCOUNTER (OUTPATIENT)
Age: 38
End: 2023-09-27

## 2023-09-27 DIAGNOSIS — Z00.00 ENCOUNTER FOR GENERAL ADULT MEDICAL EXAMINATION WITHOUT ABNORMAL FINDINGS: ICD-10-CM

## 2023-09-27 DIAGNOSIS — Z23 ENCOUNTER FOR IMMUNIZATION: ICD-10-CM

## 2023-09-27 DIAGNOSIS — T75.3XXA MOTION SICKNESS, INITIAL ENCOUNTER: ICD-10-CM

## 2023-09-27 DIAGNOSIS — Z91.010 ALLERGY TO PEANUTS: ICD-10-CM

## 2023-09-27 DIAGNOSIS — J45.20 MILD INTERMITTENT ASTHMA, UNCOMPLICATED: ICD-10-CM

## 2023-10-03 LAB
ALBUMIN SERPL ELPH-MCNC: 4.7 G/DL
ALP BLD-CCNC: 77 U/L
ALT SERPL-CCNC: 16 U/L
ANION GAP SERPL CALC-SCNC: 12 MMOL/L
AST SERPL-CCNC: 19 U/L
BASOPHILS # BLD AUTO: 0.03 K/UL
BASOPHILS NFR BLD AUTO: 0.5 %
BILIRUB SERPL-MCNC: 0.4 MG/DL
BUN SERPL-MCNC: 14 MG/DL
CALCIUM SERPL-MCNC: 9.4 MG/DL
CHLORIDE SERPL-SCNC: 102 MMOL/L
CHOLEST SERPL-MCNC: 186 MG/DL
CO2 SERPL-SCNC: 24 MMOL/L
CREAT SERPL-MCNC: 0.85 MG/DL
EGFR: 91 ML/MIN/1.73M2
EOSINOPHIL # BLD AUTO: 0.13 K/UL
EOSINOPHIL NFR BLD AUTO: 2.2 %
ESTIMATED AVERAGE GLUCOSE: 111 MG/DL
GLUCOSE SERPL-MCNC: 79 MG/DL
HBA1C MFR BLD HPLC: 5.5 %
HCT VFR BLD CALC: 45.2 %
HDLC SERPL-MCNC: 75 MG/DL
HGB BLD-MCNC: 14.6 G/DL
IMM GRANULOCYTES NFR BLD AUTO: 0.2 %
LDLC SERPL CALC-MCNC: 95 MG/DL
LYMPHOCYTES # BLD AUTO: 2.75 K/UL
LYMPHOCYTES NFR BLD AUTO: 46.1 %
MAN DIFF?: NORMAL
MCHC RBC-ENTMCNC: 29 PG
MCHC RBC-ENTMCNC: 32.3 GM/DL
MCV RBC AUTO: 89.9 FL
MONOCYTES # BLD AUTO: 0.33 K/UL
MONOCYTES NFR BLD AUTO: 5.5 %
NEUTROPHILS # BLD AUTO: 2.71 K/UL
NEUTROPHILS NFR BLD AUTO: 45.5 %
NONHDLC SERPL-MCNC: 112 MG/DL
PLATELET # BLD AUTO: 321 K/UL
POTASSIUM SERPL-SCNC: 4.4 MMOL/L
PROT SERPL-MCNC: 7.6 G/DL
RBC # BLD: 5.03 M/UL
RBC # FLD: 13.4 %
SODIUM SERPL-SCNC: 138 MMOL/L
TRIGL SERPL-MCNC: 96 MG/DL
TSH SERPL-ACNC: 1.39 UIU/ML
WBC # FLD AUTO: 5.96 K/UL

## 2024-01-30 ENCOUNTER — TRANSCRIPTION ENCOUNTER (OUTPATIENT)
Age: 39
End: 2024-01-30

## 2024-02-06 ENCOUNTER — APPOINTMENT (OUTPATIENT)
Dept: OBGYN | Facility: CLINIC | Age: 39
End: 2024-02-06
Payer: COMMERCIAL

## 2024-02-06 VITALS
OXYGEN SATURATION: 98 % | HEIGHT: 59 IN | SYSTOLIC BLOOD PRESSURE: 121 MMHG | HEART RATE: 81 BPM | WEIGHT: 137 LBS | DIASTOLIC BLOOD PRESSURE: 85 MMHG | BODY MASS INDEX: 27.62 KG/M2

## 2024-02-06 DIAGNOSIS — G47.00 INSOMNIA, UNSPECIFIED: ICD-10-CM

## 2024-02-06 DIAGNOSIS — R51.9 HEADACHE, UNSPECIFIED: ICD-10-CM

## 2024-02-06 PROCEDURE — 99213 OFFICE O/P EST LOW 20 MIN: CPT | Mod: 25

## 2024-02-06 PROCEDURE — 36415 COLL VENOUS BLD VENIPUNCTURE: CPT

## 2024-02-06 PROCEDURE — 76817 TRANSVAGINAL US OBSTETRIC: CPT | Mod: 59

## 2024-02-06 PROCEDURE — 99395 PREV VISIT EST AGE 18-39: CPT

## 2024-02-06 NOTE — HISTORY OF PRESENT ILLNESS
[FreeTextEntry1] : 39 y/o  LMP 23 presenting for confirmation of pregnancy. Last seen in 2023, Mirena IUD removed. H/o 1 vaginal delivery in  complicated by gDM. She has a hx of scoliosis surgery (spinal kadie placement) and had anesthesia consult with prior pregnancy, and reports epidural was minimally effective. Last PAP was 2022, negative PAP and HPV. She is taking PNVs.  H/o Asthma, takes albuterol prn. Pt c/o nausea and fatigue. She is also experiencing insomnia and has been getting some headaches.   ObHx:  x1 (2018, gDM) PSHx: H/o scoliosis surgery with spinal kadie placement PMHx: h/o Asthma takes albuterol prn Pt does not accept blood products.  [PapSmeardate] : 01/2022 [TextBox_31] : wnl

## 2024-02-06 NOTE — PLAN
[FreeTextEntry1] : 37 y/o  at 8 weeks by LMP, GA 7 weeks 3 days by TVUS  - TVUS today shows +YS, +fetus, +FHR, CRL measuring 11.9 mm c/w 7w 3d - Approximate LAURENT of 24 by LMP 23 - Early pregnancy precautions, practice /hospital info and folder provided to patient. - PAP UTD, GC/CT today - continue PNVs - NOB panel today - genetic carrier screening done in prior pregnancy  AMA (>35) - advised to start bASA daily, (81mg)x2 starting @ 12 weeks discussed with for PEC prphlxis - discussed NT sono and NIPS; reviewed limitations of screening - discussed invasive testing; pt declines at this time  Prior vaginal delivery x1  Hx scoliosis sx with kadie placement - She had anesthesia consult with prior pregnancy and reports epidural was minimally effective.  H/o gDM - follow up A1C today  H/o Asthma - continue albuterol prn.  Nausea - small frequent meals advised - rx Diclegis prn  Headaches - MgO supplementation  PT REFUSES BLOOD PRODUCTS, WILL NEED CONSULTS  RTO in 2 weeks for NIPS at [place] RTO in 4 weeks for NPN and sono  at [place]

## 2024-02-06 NOTE — COUNSELING
[Nutrition/ Exercise/ Weight Management] : nutrition, exercise, weight management [Vitamins/Supplements] : vitamins/supplements [Breast Self Exam] : breast self exam Wartpeel Counseling:  I discussed with the patient the risks of Wartpeel including but not limited to erythema, scaling, itching, weeping, crusting, and pain.

## 2024-02-06 NOTE — END OF VISIT
[FreeTextEntry2] : I, Katherine Kurt, acted as a scribe on behalf of Dr. Jelena Jordan on 02/06/2024 .  All medical entries made by the scribe were at my, Dr. Jelena Jordan, direction and personally dictated by me on 02/06/2024. I have reviewed the chart and agree that the record accurately reflects my personal performance of the history, physical exam, assessment and plan. I have also personally directed, reviewed, and agreed with the chart.

## 2024-02-06 NOTE — PROCEDURE
[GC Chlamydia Culture] : GC Chlamydia Culture [Transvaginal OB Sonogram] : Transvaginal OB Sonogram [Intrauterine Pregnancy] : intrauterine pregnancy [Yolk Sac] : yolk sac present [Fetal Heart] : fetal heart present [Transvaginal OB Sonogram WNL] : Transvaginal OB Sonogram WNL [CRL: ___ (mm)] : CRL - [unfilled]Umm [Current GA by Sonogram: ___ (wks)] : Current GA by Sonogram: [unfilled]Uwks [___ day(s)] : [unfilled] days

## 2024-02-07 LAB
25(OH)D3 SERPL-MCNC: 24.8 NG/ML
ABO + RH PNL BLD: NORMAL
BLD GP AB SCN SERPL QL: NORMAL
C TRACH RRNA SPEC QL NAA+PROBE: NOT DETECTED
ESTIMATED AVERAGE GLUCOSE: 105 MG/DL
HBA1C MFR BLD HPLC: 5.3 %
HBV SURFACE AG SER QL: NONREACTIVE
HGB A MFR BLD: 97.4 %
HGB A2 MFR BLD: 2.6 %
HGB FRACT BLD-IMP: NORMAL
HIV1+2 AB SPEC QL IA.RAPID: NONREACTIVE
N GONORRHOEA RRNA SPEC QL NAA+PROBE: NOT DETECTED
SOURCE AMPLIFICATION: NORMAL
TSH SERPL-ACNC: 0.69 UIU/ML

## 2024-02-08 ENCOUNTER — TRANSCRIPTION ENCOUNTER (OUTPATIENT)
Age: 39
End: 2024-02-08

## 2024-02-08 LAB
BACTERIA UR CULT: NORMAL
LEAD BLD-MCNC: <1 UG/DL
RUBV IGG FLD-ACNC: 6.1 INDEX
RUBV IGG SER-IMP: POSITIVE
T PALLIDUM AB SER QL IA: NEGATIVE
VZV AB TITR SER: POSITIVE
VZV IGG SER IF-ACNC: 775.6 INDEX

## 2024-02-09 LAB
B19V IGG SER QL IA: 0.29 INDEX
B19V IGG+IGM SER-IMP: NEGATIVE
B19V IGG+IGM SER-IMP: NORMAL
B19V IGM FLD-ACNC: 0.15 INDEX
B19V IGM SER-ACNC: NEGATIVE

## 2024-02-22 NOTE — ED PROVIDER NOTE - NS ED MD DISPO DISCHARGE CCDA
Spoke with patient relayed  response, patient verbally understood.    Patient/Caregiver provided printed discharge information.

## 2024-03-01 ENCOUNTER — TRANSCRIPTION ENCOUNTER (OUTPATIENT)
Age: 39
End: 2024-03-01

## 2024-03-06 ENCOUNTER — NON-APPOINTMENT (OUTPATIENT)
Age: 39
End: 2024-03-06

## 2024-03-06 ENCOUNTER — TRANSCRIPTION ENCOUNTER (OUTPATIENT)
Age: 39
End: 2024-03-06

## 2024-03-07 ENCOUNTER — APPOINTMENT (OUTPATIENT)
Dept: OBGYN | Facility: CLINIC | Age: 39
End: 2024-03-07
Payer: COMMERCIAL

## 2024-03-07 ENCOUNTER — ASOB RESULT (OUTPATIENT)
Age: 39
End: 2024-03-07

## 2024-03-07 VITALS
DIASTOLIC BLOOD PRESSURE: 79 MMHG | BODY MASS INDEX: 28.83 KG/M2 | SYSTOLIC BLOOD PRESSURE: 105 MMHG | HEIGHT: 59 IN | WEIGHT: 143 LBS

## 2024-03-07 PROCEDURE — 99213 OFFICE O/P EST LOW 20 MIN: CPT

## 2024-03-07 PROCEDURE — 76830 TRANSVAGINAL US NON-OB: CPT

## 2024-03-10 NOTE — PLAN
[FreeTextEntry1] : 39 yo for complete ab.   Sono today: complete ab faiza chaperone Complete AB -Rh+ -Pt miscarried yesterday. No D&C needed based on sono today.  -Referred to  Apoorva Ambrosio -RTO prn

## 2024-03-10 NOTE — HISTORY OF PRESENT ILLNESS
[FreeTextEntry1] : 39 yo presents for complete ab. LMP 2023.   ObHx:  x1 (2018, gDM) PSHx: H/o scoliosis surgery with spinal kadie placement PMHx: h/o Asthma takes albuterol prn Pt does not accept blood products.

## 2024-03-18 ENCOUNTER — TRANSCRIPTION ENCOUNTER (OUTPATIENT)
Age: 39
End: 2024-03-18

## 2024-03-18 RX ORDER — LEVONORGESTREL AND ETHINYL ESTRADIOL 0.1-0.02MG
0.1-2 KIT ORAL DAILY
Qty: 3 | Refills: 1 | Status: ACTIVE | COMMUNITY
Start: 2023-09-12 | End: 1900-01-01

## 2024-04-12 ENCOUNTER — OUTPATIENT (OUTPATIENT)
Dept: OUTPATIENT SERVICES | Facility: HOSPITAL | Age: 39
LOS: 1 days | End: 2024-04-12

## 2024-04-12 ENCOUNTER — OUTPATIENT (OUTPATIENT)
Dept: OUTPATIENT SERVICES | Facility: HOSPITAL | Age: 39
LOS: 1 days | End: 2024-04-12
Payer: COMMERCIAL

## 2024-04-12 ENCOUNTER — APPOINTMENT (OUTPATIENT)
Dept: ULTRASOUND IMAGING | Facility: CLINIC | Age: 39
End: 2024-04-12
Payer: COMMERCIAL

## 2024-04-12 ENCOUNTER — APPOINTMENT (OUTPATIENT)
Dept: INTERNAL MEDICINE | Facility: CLINIC | Age: 39
End: 2024-04-12
Payer: COMMERCIAL

## 2024-04-12 VITALS
OXYGEN SATURATION: 99 % | DIASTOLIC BLOOD PRESSURE: 83 MMHG | SYSTOLIC BLOOD PRESSURE: 115 MMHG | HEIGHT: 59 IN | HEART RATE: 79 BPM | BODY MASS INDEX: 29.64 KG/M2 | WEIGHT: 147 LBS

## 2024-04-12 DIAGNOSIS — Z13.29 ENCOUNTER FOR SCREENING FOR OTHER SUSPECTED ENDOCRINE DISORDER: ICD-10-CM

## 2024-04-12 DIAGNOSIS — Z87.898 PERSONAL HISTORY OF OTHER SPECIFIED CONDITIONS: ICD-10-CM

## 2024-04-12 DIAGNOSIS — Z11.59 ENCOUNTER FOR SCREENING FOR OTHER VIRAL DISEASES: ICD-10-CM

## 2024-04-12 DIAGNOSIS — Z01.83 ENCOUNTER FOR BLOOD TYPING: ICD-10-CM

## 2024-04-12 DIAGNOSIS — I78.1 NEVUS, NON-NEOPLASTIC: ICD-10-CM

## 2024-04-12 DIAGNOSIS — Z34.81 ENCOUNTER FOR SUPERVISION OF OTHER NORMAL PREGNANCY, FIRST TRIMESTER: ICD-10-CM

## 2024-04-12 DIAGNOSIS — Z98.1 ARTHRODESIS STATUS: Chronic | ICD-10-CM

## 2024-04-12 DIAGNOSIS — R73.9 HYPERGLYCEMIA, UNSPECIFIED: ICD-10-CM

## 2024-04-12 DIAGNOSIS — Z11.8 ENCOUNTER FOR SCREENING FOR OTHER INFECTIOUS AND PARASITIC DISEASES: ICD-10-CM

## 2024-04-12 DIAGNOSIS — Z13.21 ENCOUNTER FOR SCREENING FOR NUTRITIONAL DISORDER: ICD-10-CM

## 2024-04-12 DIAGNOSIS — M79.89 OTHER SPECIFIED SOFT TISSUE DISORDERS: ICD-10-CM

## 2024-04-12 DIAGNOSIS — N39.0 URINARY TRACT INFECTION, SITE NOT SPECIFIED: ICD-10-CM

## 2024-04-12 DIAGNOSIS — Z13.88 ENCOUNTER FOR SCREENING FOR DISORDER DUE TO EXPOSURE TO CONTAMINANTS: ICD-10-CM

## 2024-04-12 DIAGNOSIS — Z01.84 ENCOUNTER FOR ANTIBODY RESPONSE EXAMINATION: ICD-10-CM

## 2024-04-12 DIAGNOSIS — Z01.419 ENCOUNTER FOR GYNECOLOGICAL EXAMINATION (GENERAL) (ROUTINE) W/OUT ABNORMAL FINDINGS: ICD-10-CM

## 2024-04-12 DIAGNOSIS — Z11.3 ENCOUNTER FOR SCREENING FOR INFECTIONS WITH A PREDOMINANTLY SEXUAL MODE OF TRANSMISSION: ICD-10-CM

## 2024-04-12 DIAGNOSIS — I87.2 VENOUS INSUFFICIENCY (CHRONIC) (PERIPHERAL): ICD-10-CM

## 2024-04-12 DIAGNOSIS — Z11.4 ENCOUNTER FOR SCREENING FOR HUMAN IMMUNODEFICIENCY VIRUS [HIV]: ICD-10-CM

## 2024-04-12 DIAGNOSIS — J45.20 MILD INTERMITTENT ASTHMA, UNCOMPLICATED: ICD-10-CM

## 2024-04-12 DIAGNOSIS — M25.461 EFFUSION, RIGHT KNEE: ICD-10-CM

## 2024-04-12 DIAGNOSIS — Z32.01 ENCOUNTER FOR PREGNANCY TEST, RESULT POSITIVE: ICD-10-CM

## 2024-04-12 PROCEDURE — 93971 EXTREMITY STUDY: CPT

## 2024-04-12 PROCEDURE — 93971 EXTREMITY STUDY: CPT | Mod: 26

## 2024-04-12 PROCEDURE — G2211 COMPLEX E/M VISIT ADD ON: CPT | Mod: NC,1L

## 2024-04-12 PROCEDURE — 99213 OFFICE O/P EST LOW 20 MIN: CPT

## 2024-04-12 RX ORDER — DOXYLAMINE SUCCINATE AND PYRIDOXINE HYDROCHLORIDE 10; 10 MG/1; MG/1
10-10 TABLET, DELAYED RELEASE ORAL
Qty: 180 | Refills: 2 | Status: DISCONTINUED | COMMUNITY
Start: 2024-02-06 | End: 2024-02-06

## 2024-04-12 NOTE — REVIEW OF SYSTEMS
[Fever] : no fever [Chills] : no chills [Chest Pain] : no chest pain [Palpitations] : no palpitations [Leg Claudication] : no leg claudication [Lower Ext Edema] : lower extremity edema [Orthopnea] : no orthopnea [Paroxysmal Nocturnal Dyspnea] : no paroxysmal nocturnal dyspnea [Negative] : Respiratory

## 2024-04-12 NOTE — HISTORY OF PRESENT ILLNESS
[FreeTextEntry8] : Left leg swelling for 2 days.     Noticing swelling in the left ankle as well, lower left calf is also swollen.  Not painful to the touch.    No falls or injuries.    Never had issues with swelling with one leg.  Only during pregnancy had swelling in both legs.  On birth control pills in the past month that is new.

## 2024-04-12 NOTE — PHYSICAL EXAM
[No Acute Distress] : no acute distress [Well-Appearing] : well-appearing [Normal Voice/Communication] : normal voice/communication [No Respiratory Distress] : no respiratory distress  [Pedal Pulses Present] : the pedal pulses are present [No Rash] : no rash [de-identified] : noted left lower leg slightly more swollen compared to right. No cord palpable, negative Jovany's sign. edema is non-pitting. Also noted varicose veins and spider veins.

## 2024-04-15 DIAGNOSIS — M79.89 OTHER SPECIFIED SOFT TISSUE DISORDERS: ICD-10-CM

## 2024-04-15 DIAGNOSIS — I78.1 NEVUS, NON-NEOPLASTIC: ICD-10-CM

## 2024-04-15 DIAGNOSIS — J45.20 MILD INTERMITTENT ASTHMA, UNCOMPLICATED: ICD-10-CM

## 2024-04-15 DIAGNOSIS — I87.2 VENOUS INSUFFICIENCY (CHRONIC) (PERIPHERAL): ICD-10-CM

## 2024-04-23 ENCOUNTER — NON-APPOINTMENT (OUTPATIENT)
Age: 39
End: 2024-04-23

## 2024-04-26 ENCOUNTER — APPOINTMENT (OUTPATIENT)
Dept: DERMATOLOGY | Facility: CLINIC | Age: 39
End: 2024-04-26
Payer: COMMERCIAL

## 2024-04-26 DIAGNOSIS — L21.9 SEBORRHEIC DERMATITIS, UNSPECIFIED: ICD-10-CM

## 2024-04-26 DIAGNOSIS — L70.9 ACNE, UNSPECIFIED: ICD-10-CM

## 2024-04-26 DIAGNOSIS — L60.3 NAIL DYSTROPHY: ICD-10-CM

## 2024-04-26 PROCEDURE — 99214 OFFICE O/P EST MOD 30 MIN: CPT

## 2024-04-26 RX ORDER — CLINDAMYCIN PHOSPHATE 1 G/10ML
1 GEL TOPICAL
Qty: 1 | Refills: 6 | Status: ACTIVE | COMMUNITY
Start: 2023-01-06 | End: 1900-01-01

## 2024-04-26 RX ORDER — BENZOYL PEROXIDE 50 MG/G
5 EMULSION TOPICAL
Qty: 1 | Refills: 5 | Status: ACTIVE | COMMUNITY
Start: 2023-01-06 | End: 1900-01-01

## 2024-04-26 RX ORDER — KETOCONAZOLE 20 MG/G
2 CREAM TOPICAL
Qty: 1 | Refills: 5 | Status: ACTIVE | COMMUNITY
Start: 2024-04-26 | End: 1900-01-01

## 2024-04-26 RX ORDER — TRETINOIN 0.25 MG/G
0.03 CREAM TOPICAL
Qty: 1 | Refills: 5 | Status: ACTIVE | COMMUNITY
Start: 2023-01-06 | End: 1900-01-01

## 2024-04-26 NOTE — PHYSICAL EXAM
[FreeTextEntry3] : General: well appearing person in nad, alert, pleasant Focused Skin Exam per patient preference: - glabella with hypopigmented patch with minimal overlying scale - fingernails wnl - b/l 1st toenails with lateral yellowing - minimal comedones on face - chest and back with many scattered hyperpigmented papules and macules

## 2024-04-26 NOTE — HISTORY OF PRESENT ILLNESS
[FreeTextEntry1] : RPV: acne, nail, rash [de-identified] : ERICA GOLDSTEIN is a 37 year old  last seen1 year ago here for:  1. Nail issue - had hand foot mouth in Dec 2022, fingernails resolved but 1st 2 toenails still abnormal 2. Acne- prev did a round of accutane when she was a teen, Restarted bp, clinda, and tret given at last visit. Had stopped since was pregnant (but unfortunately lost pregnancy), found acne flared afterwards due to hormone changes, now with many dark marks on back/shoulders.  3. Has light patch on forehead between eyebrows, Previously was a bit flakey  Skin Cancer Hx: none  FH of skin cancer: none  Meds; OCPs, vitamins Social hx: Works in an office

## 2024-04-26 NOTE — ASSESSMENT
[FreeTextEntry1] : #Onychomadesis of fingernails resolved # Dystrophy of b/l first toenails - likely 2/2 trauma/HFMD, but nail clipping today taken to r/o onychomychosis. Patient interested in oral terbinafine if positive, denies history of liver issues. Not on any meds other than birth control and vitamins  # Acne, face and trunk with PIH Chronic; not at treatment goal - Reviewed expected time course of improving on topical regimen (can take 6-8 weeks for earliest signs of improvement; 3-6 months should reach maximum anticipated improvement)  - Restart OTC benzoyl peroxide wash 4% qAM, SED - Restart clindamycin 1% gel daily to AA, SED - Wash acne prone areas nightly with mild noncomedogenic soap qPM - Resart tretinoin 0.025% cream nightly to face and trunk. Start MWF for 1-2 weeks then increase to nightly as tolerated. Discussed proper application and SEs including but not limited to irritation and photosensitivity. Discussed pregnancy contraindication. If well tolerated, consider going up to 0.05% at next visit. - sunprotection. Could consider azelaic acid for PIH but since sensitive skin, will hold off - if no improvement at next visit, can consider winlevi or dapsone  # Favor petalloid seb derm, glabella/forehead chronic, flare - education, counseling - start keto cream mixed with tacrolimus ointment BID, SED (will avoid HC since already hypopigmented)  RTC 3 months

## 2024-04-30 RX ORDER — TACROLIMUS 1 MG/G
0.1 OINTMENT TOPICAL
Qty: 1 | Refills: 6 | Status: ACTIVE | COMMUNITY
Start: 2024-04-26

## 2024-05-07 LAB — DERMATOLOGY BIOPSY: NORMAL

## 2024-05-08 ENCOUNTER — TRANSCRIPTION ENCOUNTER (OUTPATIENT)
Age: 39
End: 2024-05-08

## 2024-05-09 ENCOUNTER — APPOINTMENT (OUTPATIENT)
Dept: VASCULAR SURGERY | Facility: CLINIC | Age: 39
End: 2024-05-09
Payer: COMMERCIAL

## 2024-05-09 ENCOUNTER — NON-APPOINTMENT (OUTPATIENT)
Age: 39
End: 2024-05-09

## 2024-05-09 VITALS
BODY MASS INDEX: 28.22 KG/M2 | WEIGHT: 140 LBS | SYSTOLIC BLOOD PRESSURE: 112 MMHG | DIASTOLIC BLOOD PRESSURE: 77 MMHG | HEART RATE: 80 BPM | TEMPERATURE: 97.4 F | HEIGHT: 59 IN

## 2024-05-09 DIAGNOSIS — I83.893 VARICOSE VEINS OF BILATERAL LOWER EXTREMITIES WITH OTHER COMPLICATIONS: ICD-10-CM

## 2024-05-09 PROCEDURE — 93970 EXTREMITY STUDY: CPT

## 2024-05-09 PROCEDURE — 99203 OFFICE O/P NEW LOW 30 MIN: CPT

## 2024-05-10 PROBLEM — I83.893 SYMPTOMATIC VARICOSE VEINS OF BOTH LOWER EXTREMITIES: Status: ACTIVE | Noted: 2024-05-10

## 2024-05-16 NOTE — ED PROCEDURE NOTE - PROCEDURE ADDITIONAL DETAILS
PT. C/O RIGHT LOWER ABD. PAIN FOR PAST 5 DAYS, STATES ALSO WITH N/V/D. DENIES BACK PAIN OR URINARY SYMPTOMS.   
Right knee immobilizer applied after manual reduction of right patella.  N/V- intact.

## 2024-05-21 ENCOUNTER — APPOINTMENT (OUTPATIENT)
Dept: ALLERGY | Facility: CLINIC | Age: 39
End: 2024-05-21
Payer: COMMERCIAL

## 2024-05-21 VITALS
BODY MASS INDEX: 28.22 KG/M2 | OXYGEN SATURATION: 98 % | SYSTOLIC BLOOD PRESSURE: 122 MMHG | HEART RATE: 110 BPM | DIASTOLIC BLOOD PRESSURE: 84 MMHG | WEIGHT: 140 LBS | TEMPERATURE: 98.7 F | HEIGHT: 59 IN

## 2024-05-21 PROCEDURE — 99213 OFFICE O/P EST LOW 20 MIN: CPT

## 2024-05-21 RX ORDER — BETAMETHASONE DIPROPIONATE 0.5 MG/G
0.05 CREAM, AUGMENTED TOPICAL TWICE DAILY
Qty: 80 | Refills: 0 | Status: ACTIVE | COMMUNITY
Start: 2024-05-21 | End: 1900-01-01

## 2024-05-21 NOTE — PHYSICAL EXAM
[Alert] : alert [Well Nourished] : well nourished [Healthy Appearance] : healthy appearance [No Acute Distress] : no acute distress [Well Developed] : well developed [Normal Voice/Communication] : normal voice communication [No Neck Mass] : no neck mass was observed [Patches] : ~M patches present [de-identified] : papular patch on LE

## 2024-05-21 NOTE — HISTORY OF PRESENT ILLNESS
[de-identified] : Two weeks of itchy rash on her legs - she has introduced a new vegetable drink into her diet.   She has been using

## 2024-05-21 NOTE — ASSESSMENT
[FreeTextEntry1] : Eczematous eruption on LE:  Diprolene cream BID to the LE  Hold off on tacrolimus for now  RV prn

## 2024-07-30 ENCOUNTER — RX RENEWAL (OUTPATIENT)
Age: 39
End: 2024-07-30

## 2024-08-01 ENCOUNTER — NON-APPOINTMENT (OUTPATIENT)
Age: 39
End: 2024-08-01

## 2024-08-01 ENCOUNTER — APPOINTMENT (OUTPATIENT)
Dept: GASTROENTEROLOGY | Facility: CLINIC | Age: 39
End: 2024-08-01

## 2024-08-01 VITALS
BODY MASS INDEX: 28.22 KG/M2 | SYSTOLIC BLOOD PRESSURE: 114 MMHG | WEIGHT: 140 LBS | HEART RATE: 86 BPM | DIASTOLIC BLOOD PRESSURE: 79 MMHG | HEIGHT: 59 IN

## 2024-08-01 DIAGNOSIS — R68.81 EARLY SATIETY: ICD-10-CM

## 2024-08-01 DIAGNOSIS — R10.10 UPPER ABDOMINAL PAIN, UNSPECIFIED: ICD-10-CM

## 2024-08-01 DIAGNOSIS — E66.3 OVERWEIGHT: ICD-10-CM

## 2024-08-01 DIAGNOSIS — R14.0 ABDOMINAL DISTENSION (GASEOUS): ICD-10-CM

## 2024-08-01 DIAGNOSIS — K59.09 OTHER CONSTIPATION: ICD-10-CM

## 2024-08-01 PROCEDURE — 36415 COLL VENOUS BLD VENIPUNCTURE: CPT | Mod: 59

## 2024-08-01 PROCEDURE — 99204 OFFICE O/P NEW MOD 45 MIN: CPT | Mod: 25

## 2024-08-01 NOTE — HISTORY OF PRESENT ILLNESS
[FreeTextEntry1] : Beginning about one month ago, Rae began to experience intermittent pain across the upper abdomen, with nearly predictable postprandial bloating and increased burping, early satiety.  She has had bloating for many years, with panendoscopy several years ago reportedly unrevealing.  At that time, she had been given omeprazole for six months, without improvement, although she feels better with Tums.  She averages BM every 3 days, previously received various treatments for her bowels, without significant improvement.  She underwent cholecystectomy in 2019, feels more urgency and diarrhea after certain foods ever since then.  Her  had Helicobacter.  She will be traveling to [facility] in [facility] shortly.

## 2024-08-01 NOTE — REVIEW OF SYSTEMS
[Feeling Poorly] : feeling poorly [Feeling Tired] : feeling tired [Abdominal Pain] : abdominal pain [Negative] : Heme/Lymph [As Noted in HPI] : as noted in HPI [Constipation] : constipation [Bloating (gassiness)] : bloating

## 2024-08-01 NOTE — ASSESSMENT
[FreeTextEntry1] : 1.  Recent upper abdominal pain, postprandial bloating, early satiety, with negative EGD several years ago--possible Helicobacter pylori, choledocholithiasis, gastroparesis. 2.  Chronic constipation and bloating; reportedly negative colonoscopy several years ago--likely IBS-C. 3.  Overweight. 4.  History of scoliosis status post surgery 2000. 5.  Status post cholecystectomy. 6.  Asthma. 7.  Vitamin D insufficiency. 8.  Allergic to peanuts, possibly aspirin.  Plan: 1.  Medical records reviewed. 2.  She will try to retrieve panendoscopy/biopsy results from prior GI ([address] 3.  Bloodwork drawn by me this morning. 4.  Abdominal ultrasound. 5.  Stool for Helicobacter pylori antigen. (Would defer Rx until after she returns from vacation, however.) 6.  Other recommendations depend upon results of above.

## 2024-08-01 NOTE — CONSULT LETTER
[Dear  ___] : Dear  [unfilled], [Courtesy Letter:] : I had the pleasure of seeing your patient, [unfilled], in my office today. [Please see my note below.] : Please see my note below. [Consult Closing:] : Thank you very much for allowing me to participate in the care of this patient.  If you have any questions, please do not hesitate to contact me. [Sincerely,] : Sincerely, [FreeTextEntry3] : Reynaldo Boland M.D.

## 2024-08-01 NOTE — PHYSICAL EXAM
[Alert] : alert [Normal Voice/Communication] : normal voice/communication [No Acute Distress] : no acute distress [Well Developed] : well developed [Well Nourished] : well nourished [None] : no edema [Abdomen Tenderness] : non-tender [No Masses] : no abdominal mass palpated [Abdomen Soft] : soft [] : no hepatosplenomegaly [No Hernia] : no hernia [Diminished] : diminished [Normal Color / Pigmentation] : normal skin color and pigmentation [Normal] : oriented to person, place, and time [de-identified] : overweight [FreeTextEntry1] : glasses [de-identified] : surgical scars

## 2024-08-02 ENCOUNTER — NON-APPOINTMENT (OUTPATIENT)
Age: 39
End: 2024-08-02

## 2024-08-02 LAB
ALBUMIN SERPL ELPH-MCNC: 4.3 G/DL
ALP BLD-CCNC: 60 U/L
ALT SERPL-CCNC: 18 U/L
AMYLASE/CREAT SERPL: 165 U/L
ANION GAP SERPL CALC-SCNC: 11 MMOL/L
AST SERPL-CCNC: 18 U/L
BASOPHILS # BLD AUTO: 0.02 K/UL
BASOPHILS NFR BLD AUTO: 0.5 %
BILIRUB DIRECT SERPL-MCNC: 0.1 MG/DL
BILIRUB SERPL-MCNC: 0.4 MG/DL
BUN SERPL-MCNC: 9 MG/DL
CALCIUM SERPL-MCNC: 9.3 MG/DL
CHLORIDE SERPL-SCNC: 108 MMOL/L
CO2 SERPL-SCNC: 21 MMOL/L
CREAT SERPL-MCNC: 0.84 MG/DL
CRP SERPL-MCNC: 4 MG/L
EGFR: 92 ML/MIN/1.73M2
EOSINOPHIL # BLD AUTO: 0.04 K/UL
EOSINOPHIL NFR BLD AUTO: 1 %
ERYTHROCYTE [SEDIMENTATION RATE] IN BLOOD BY WESTERGREN METHOD: 13 MM/HR
ESTIMATED AVERAGE GLUCOSE: 114 MG/DL
FERRITIN SERPL-MCNC: 98 NG/ML
GGT SERPL-CCNC: 12 U/L
GLUCOSE SERPL-MCNC: 89 MG/DL
HBA1C MFR BLD HPLC: 5.6 %
HCT VFR BLD CALC: 42 %
HGB BLD-MCNC: 13.6 G/DL
IMM GRANULOCYTES NFR BLD AUTO: 0 %
IRON SATN MFR SERPL: 36 %
IRON SERPL-MCNC: 118 UG/DL
LPL SERPL-CCNC: 38 U/L
LYMPHOCYTES # BLD AUTO: 1.61 K/UL
LYMPHOCYTES NFR BLD AUTO: 38.7 %
MAGNESIUM SERPL-MCNC: 2.1 MG/DL
MAN DIFF?: NORMAL
MCHC RBC-ENTMCNC: 28.6 PG
MCHC RBC-ENTMCNC: 32.4 GM/DL
MCV RBC AUTO: 88.4 FL
MONOCYTES # BLD AUTO: 0.29 K/UL
MONOCYTES NFR BLD AUTO: 7 %
NEUTROPHILS # BLD AUTO: 2.2 K/UL
NEUTROPHILS NFR BLD AUTO: 52.8 %
PHOSPHATE SERPL-MCNC: 3.1 MG/DL
PLATELET # BLD AUTO: 261 K/UL
POTASSIUM SERPL-SCNC: 4.6 MMOL/L
PROT SERPL-MCNC: 7.1 G/DL
RBC # BLD: 4.75 M/UL
RBC # FLD: 14.1 %
SODIUM SERPL-SCNC: 139 MMOL/L
TIBC SERPL-MCNC: 328 UG/DL
TSH SERPL-ACNC: 0.98 UIU/ML
UIBC SERPL-MCNC: 210 UG/DL
WBC # FLD AUTO: 4.16 K/UL

## 2024-08-05 ENCOUNTER — NON-APPOINTMENT (OUTPATIENT)
Age: 39
End: 2024-08-05

## 2024-08-05 LAB
GLIADIN IGA SER QL: 3.2 U/ML
GLIADIN IGG SER QL: <0.4 U/ML
GLIADIN PEPTIDE IGA SER-ACNC: NEGATIVE
GLIADIN PEPTIDE IGG SER-ACNC: NEGATIVE
IGA SER QL IEP: 126 MG/DL
TTG IGA SER IA-ACNC: <0.5 U/ML
TTG IGA SER-ACNC: NEGATIVE
TTG IGG SER IA-ACNC: <0.8 U/ML
TTG IGG SER IA-ACNC: NEGATIVE

## 2024-08-06 LAB
ENDOMYSIUM IGA SER QL: NEGATIVE
ENDOMYSIUM IGA TITR SER: NORMAL
H PYLORI AG STL QL: NEGATIVE

## 2024-08-08 ENCOUNTER — TRANSCRIPTION ENCOUNTER (OUTPATIENT)
Age: 39
End: 2024-08-08

## 2024-08-08 ENCOUNTER — APPOINTMENT (OUTPATIENT)
Dept: ULTRASOUND IMAGING | Facility: CLINIC | Age: 39
End: 2024-08-08

## 2024-08-08 PROCEDURE — 76700 US EXAM ABDOM COMPLETE: CPT

## 2024-08-08 RX ORDER — LEVONORGESTREL AND ETHINYL ESTRADIOL 0.1-0.02MG
0.1-2 KIT ORAL
Qty: 30 | Refills: 0 | Status: ACTIVE | COMMUNITY
Start: 2024-07-30 | End: 1900-01-01

## 2024-08-23 ENCOUNTER — NON-APPOINTMENT (OUTPATIENT)
Age: 39
End: 2024-08-23

## 2024-11-12 ENCOUNTER — RX RENEWAL (OUTPATIENT)
Age: 39
End: 2024-11-12

## 2024-12-05 ENCOUNTER — TRANSCRIPTION ENCOUNTER (OUTPATIENT)
Age: 39
End: 2024-12-05

## 2024-12-05 DIAGNOSIS — N76.0 ACUTE VAGINITIS: ICD-10-CM

## 2024-12-05 DIAGNOSIS — B96.89 ACUTE VAGINITIS: ICD-10-CM

## 2024-12-05 RX ORDER — FLUCONAZOLE 150 MG/1
150 TABLET ORAL
Qty: 2 | Refills: 0 | Status: ACTIVE | COMMUNITY
Start: 2024-12-05 | End: 1900-01-01

## 2024-12-05 RX ORDER — METRONIDAZOLE 7.5 MG/G
0.75 GEL VAGINAL
Qty: 1 | Refills: 0 | Status: ACTIVE | COMMUNITY
Start: 2024-12-05 | End: 1900-01-01

## 2024-12-18 NOTE — PATIENT PROFILE ADULT - NSPROMEDSPUMP_GEN_A_NUR
[facility]  part of [facility]     Progress Note    Hoda Busby Patient Status:  Inpatient    1938 MRN [id number]   Location [facility] 5SW/SE Attending Jigar Apple MD   Hosp Day # 5 PCP Malathi Stuart MD       Subjective:   Patient seen and examined.  Denies significant dyspnea.  No significant cough    Objective:   Blood pressure 107/52, pulse 79, temperature 98.2 °F (36.8 °C), temperature source Oral, resp. rate 18, height 5' 8\" (1.727 m), weight 174 lb (78.9 kg), SpO2 100%.  Intake/Output:   Last 3 shifts: I/O last 3 completed shifts:  In: 730 [P.O.:90; I.V.:640]  Out: -    This shift: I/O this shift:  In: 180 [P.O.:180]  Out: -      Vent Settings:      Hemodynamic parameters (last 24 hours):      Scheduled Meds:   Current Facility-Administered Medications   Medication Dose Route Frequency    [Held by provider] amLODIPine (Norvasc) tab 2.5 mg  2.5 mg Oral Daily    predniSONE (Deltasone) tab 30 mg  30 mg Oral Daily with breakfast    pantoprazole (Protonix) 40 mg in sodium chloride 0.9% PF 10 mL IV push  40 mg Intravenous Q12H    alendronate (Fosamax) tab 70 mg  70 mg Oral Q7 Days    atorvastatin (Lipitor) tab 20 mg  20 mg Oral Nightly    calcium carbonate-vitamin D (Oyster Shell-D) 250-3.125 MG-MCG per tab 1 tablet  1 tablet Oral Daily    cholecalciferol (Vitamin D3) tab 4,000 Units  4,000 Units Oral Daily    ferrous sulfate DR tab 325 mg  325 mg Oral Daily    polyethylene glycol (PEG 3350) (Miralax) 17 g oral packet 17 g  17 g Oral Daily    sulfamethoxazole-trimethoprim DS (Bactrim DS) 800-160 MG per tab 1 tablet  1 tablet Oral Once per day on     cyanocobalamin (Vitamin B12) tab 500 mcg  500 mcg Oral Daily    zolpidem (Ambien) tab 5 mg  5 mg Oral Nightly PRN       Continuous Infusions:     Physical Exam  Constitutional: no acute distress  Eyes: PERRL  ENT: nares pateint  Neck: supple, no JVD  Cardio: RRR, S1 S2  Respiratory: clear to  auscultation bilaterally, no wheezing, rales, rhonchi, crackles  GI: abdomen soft, non tender, active bowel sounds, no organomegaly  Extremities: no clubbing, cyanosis, edema  Neurologic: no gross motor deficits  Skin: warm, dry      Results:     Lab Results   Component Value Date    WBC 8.9 12/18/2024    HGB 10.3 12/18/2024    HCT 32.3 12/18/2024    .0 12/18/2024    CREATSERUM 1.16 12/18/2024    BUN 12 12/18/2024     12/18/2024    K 4.3 12/18/2024    K 4.3 12/18/2024     12/18/2024    CO2 27.0 12/18/2024    GLU 90 12/18/2024    CA 8.4 12/18/2024       No results found.          Assessment   1.  Acute blood loss anemia/acute GI bleed  2.  ILD  3.  Recent pulmonary embolism  4.  Prior nicotine dependence  5.  Hypertension  6.  Severe aortic stenosis     Plan   -Patient presents with evidence of worsening anemia GI evaluated patient with endoscopic evaluation revealing evidence of antral ulcer with no active bleeding seen during previous hospitalization.  Plan for endoscopy later today  -Appears to be improved on steroid therapy.  Had been on 40 mg prednisone since discharge from recent hospitalization on 11/1/2024.  Plan to repeat CT high-resolution chest to further evaluate.  -CT chest on 10/25/2024 with evidence of segmental pulmonary embolism in right upper lobe and left upper lobe.  Multifocal groundglass opacities seen bilaterally.  Differential includes ILD including organizing pneumonia, hypersensitivity pneumonitis, NSIP.  -Repeat CT chest high-resolution on 12/9/2024 with significant improvement in groundglass and consolidation seen bilaterally.  Mosaic attenuation seen bilaterally with some subpleural reticulation.  -Patient has been on prednisone 40 mg daily and Bactrim double strength 3 times weekly.  Will transition to prednisone 30 mg daily for 10 days followed by 20 mg daily for 10 days and 10 mg daily for 10 days before discontinuing.  Continue Bactrim double strength 3 times weekly  in the meantime  -Recommend outpatient pulmonary follow-up and pulmonary function testing  -Okay for discharge from pulmonary perspective.  Follow-up in pulmonary clinic in 3-4 weeks.  Will sign off.    Cody Holloway, DO  Pulmonary Critical Care Medicine  [facility]    none

## 2024-12-23 ENCOUNTER — APPOINTMENT (OUTPATIENT)
Dept: ORTHOPEDIC SURGERY | Facility: CLINIC | Age: 39
End: 2024-12-23
Payer: COMMERCIAL

## 2024-12-23 VITALS — WEIGHT: 140 LBS | BODY MASS INDEX: 28.22 KG/M2 | HEIGHT: 59 IN

## 2024-12-23 DIAGNOSIS — M17.12 UNILATERAL PRIMARY OSTEOARTHRITIS, LEFT KNEE: ICD-10-CM

## 2024-12-23 DIAGNOSIS — M25.562 PAIN IN LEFT KNEE: ICD-10-CM

## 2024-12-23 PROCEDURE — 73562 X-RAY EXAM OF KNEE 3: CPT | Mod: LT

## 2024-12-23 PROCEDURE — 99214 OFFICE O/P EST MOD 30 MIN: CPT

## 2024-12-28 NOTE — PROGRESS NOTE ADULT - ATTENDING COMMENTS
Patient seen and examined  Agree with Above assessment and plan   pt stable d/c home
Patient seen and examined  Agree with Above assessment and plan
Patient seen and examined  Agree with Above assessment and plan  will dc/ Invanz  f/u LE dopplers
Home

## 2025-01-03 ENCOUNTER — OUTPATIENT (OUTPATIENT)
Dept: OUTPATIENT SERVICES | Facility: HOSPITAL | Age: 40
LOS: 1 days | End: 2025-01-03
Payer: COMMERCIAL

## 2025-01-03 ENCOUNTER — APPOINTMENT (OUTPATIENT)
Dept: MRI IMAGING | Facility: CLINIC | Age: 40
End: 2025-01-03
Payer: COMMERCIAL

## 2025-01-03 DIAGNOSIS — M25.562 PAIN IN LEFT KNEE: ICD-10-CM

## 2025-01-03 DIAGNOSIS — Z98.1 ARTHRODESIS STATUS: Chronic | ICD-10-CM

## 2025-01-03 PROCEDURE — 73721 MRI JNT OF LWR EXTRE W/O DYE: CPT | Mod: 26,LT

## 2025-01-03 PROCEDURE — 73721 MRI JNT OF LWR EXTRE W/O DYE: CPT

## 2025-01-10 ENCOUNTER — APPOINTMENT (OUTPATIENT)
Dept: INTERNAL MEDICINE | Facility: CLINIC | Age: 40
End: 2025-01-10
Payer: COMMERCIAL

## 2025-01-10 ENCOUNTER — OUTPATIENT (OUTPATIENT)
Dept: OUTPATIENT SERVICES | Facility: HOSPITAL | Age: 40
LOS: 1 days | End: 2025-01-10

## 2025-01-10 VITALS
BODY MASS INDEX: 29.23 KG/M2 | DIASTOLIC BLOOD PRESSURE: 85 MMHG | OXYGEN SATURATION: 99 % | SYSTOLIC BLOOD PRESSURE: 120 MMHG | HEIGHT: 59 IN | HEART RATE: 84 BPM | WEIGHT: 145 LBS

## 2025-01-10 VITALS — DIASTOLIC BLOOD PRESSURE: 78 MMHG | SYSTOLIC BLOOD PRESSURE: 110 MMHG

## 2025-01-10 DIAGNOSIS — N76.0 ACUTE VAGINITIS: ICD-10-CM

## 2025-01-10 DIAGNOSIS — I83.893 VARICOSE VEINS OF BILATERAL LOWER EXTREMITIES WITH OTHER COMPLICATIONS: ICD-10-CM

## 2025-01-10 DIAGNOSIS — Z91.010 ALLERGY TO PEANUTS: ICD-10-CM

## 2025-01-10 DIAGNOSIS — J45.20 MILD INTERMITTENT ASTHMA, UNCOMPLICATED: ICD-10-CM

## 2025-01-10 DIAGNOSIS — M79.89 OTHER SPECIFIED SOFT TISSUE DISORDERS: ICD-10-CM

## 2025-01-10 DIAGNOSIS — M17.11 UNILATERAL PRIMARY OSTEOARTHRITIS, RIGHT KNEE: ICD-10-CM

## 2025-01-10 DIAGNOSIS — R51.9 HEADACHE, UNSPECIFIED: ICD-10-CM

## 2025-01-10 DIAGNOSIS — Z82.61 FAMILY HISTORY OF ARTHRITIS: ICD-10-CM

## 2025-01-10 DIAGNOSIS — R23.2 FLUSHING: ICD-10-CM

## 2025-01-10 DIAGNOSIS — Z00.00 ENCOUNTER FOR GENERAL ADULT MEDICAL EXAMINATION W/OUT ABNORMAL FINDINGS: ICD-10-CM

## 2025-01-10 DIAGNOSIS — B96.89 ACUTE VAGINITIS: ICD-10-CM

## 2025-01-10 DIAGNOSIS — Z98.1 ARTHRODESIS STATUS: Chronic | ICD-10-CM

## 2025-01-10 DIAGNOSIS — Z23 ENCOUNTER FOR IMMUNIZATION: ICD-10-CM

## 2025-01-10 DIAGNOSIS — M25.60 STIFFNESS OF UNSPECIFIED JOINT, NOT ELSEWHERE CLASSIFIED: ICD-10-CM

## 2025-01-10 DIAGNOSIS — M25.561 PAIN IN RIGHT KNEE: ICD-10-CM

## 2025-01-10 PROCEDURE — 99395 PREV VISIT EST AGE 18-39: CPT

## 2025-01-13 DIAGNOSIS — Z00.00 ENCOUNTER FOR GENERAL ADULT MEDICAL EXAMINATION WITHOUT ABNORMAL FINDINGS: ICD-10-CM

## 2025-01-13 DIAGNOSIS — J45.20 MILD INTERMITTENT ASTHMA, UNCOMPLICATED: ICD-10-CM

## 2025-01-13 DIAGNOSIS — Z91.010 ALLERGY TO PEANUTS: ICD-10-CM

## 2025-01-13 DIAGNOSIS — M25.60 STIFFNESS OF UNSPECIFIED JOINT, NOT ELSEWHERE CLASSIFIED: ICD-10-CM

## 2025-01-13 DIAGNOSIS — R51.9 HEADACHE, UNSPECIFIED: ICD-10-CM

## 2025-01-13 DIAGNOSIS — R23.2 FLUSHING: ICD-10-CM

## 2025-01-14 ENCOUNTER — APPOINTMENT (OUTPATIENT)
Dept: OBGYN | Facility: CLINIC | Age: 40
End: 2025-01-14
Payer: COMMERCIAL

## 2025-01-14 VITALS
BODY MASS INDEX: 29.23 KG/M2 | HEIGHT: 59 IN | DIASTOLIC BLOOD PRESSURE: 77 MMHG | SYSTOLIC BLOOD PRESSURE: 117 MMHG | WEIGHT: 145 LBS

## 2025-01-14 DIAGNOSIS — Z11.51 ENCOUNTER FOR SCREENING FOR HUMAN PAPILLOMAVIRUS (HPV): ICD-10-CM

## 2025-01-14 DIAGNOSIS — Z01.419 ENCOUNTER FOR GYNECOLOGICAL EXAMINATION (GENERAL) (ROUTINE) W/OUT ABNORMAL FINDINGS: ICD-10-CM

## 2025-01-14 PROCEDURE — 99395 PREV VISIT EST AGE 18-39: CPT

## 2025-01-14 RX ORDER — DROSPIRENONE AND ESTETROL 3-14.2(28)
3-14.2 KIT ORAL
Qty: 30 | Refills: 2 | Status: ACTIVE | COMMUNITY
Start: 2025-01-14 | End: 1900-01-01

## 2025-01-16 LAB
ALBUMIN SERPL ELPH-MCNC: 4.4 G/DL
ALP BLD-CCNC: 58 U/L
ALT SERPL-CCNC: 15 U/L
ANION GAP SERPL CALC-SCNC: 10 MMOL/L
AST SERPL-CCNC: 14 U/L
BASOPHILS # BLD AUTO: 0.02 K/UL
BASOPHILS NFR BLD AUTO: 0.4 %
BILIRUB SERPL-MCNC: 0.8 MG/DL
BUN SERPL-MCNC: 9 MG/DL
CALCIUM SERPL-MCNC: 9.1 MG/DL
CHLORIDE SERPL-SCNC: 103 MMOL/L
CHOLEST SERPL-MCNC: 169 MG/DL
CO2 SERPL-SCNC: 24 MMOL/L
CREAT SERPL-MCNC: 0.76 MG/DL
EGFR: 103 ML/MIN/1.73M2
EOSINOPHIL # BLD AUTO: 0.04 K/UL
EOSINOPHIL NFR BLD AUTO: 0.8 %
ERYTHROCYTE [SEDIMENTATION RATE] IN BLOOD BY WESTERGREN METHOD: 9 MM/HR
ESTIMATED AVERAGE GLUCOSE: 108 MG/DL
GLUCOSE SERPL-MCNC: 80 MG/DL
HBA1C MFR BLD HPLC: 5.4 %
HCT VFR BLD CALC: 43.2 %
HDLC SERPL-MCNC: 70 MG/DL
HGB BLD-MCNC: 13.8 G/DL
HPV HIGH+LOW RISK DNA PNL CVX: NOT DETECTED
IMM GRANULOCYTES NFR BLD AUTO: 0.2 %
LDLC SERPL CALC-MCNC: 88 MG/DL
LYMPHOCYTES # BLD AUTO: 2.12 K/UL
LYMPHOCYTES NFR BLD AUTO: 43.5 %
M TB IFN-G BLD-IMP: NEGATIVE
MAN DIFF?: NORMAL
MCHC RBC-ENTMCNC: 28.4 PG
MCHC RBC-ENTMCNC: 31.9 G/DL
MCV RBC AUTO: 88.9 FL
MONOCYTES # BLD AUTO: 0.36 K/UL
MONOCYTES NFR BLD AUTO: 7.4 %
NEUTROPHILS # BLD AUTO: 2.32 K/UL
NEUTROPHILS NFR BLD AUTO: 47.7 %
NONHDLC SERPL-MCNC: 98 MG/DL
PLATELET # BLD AUTO: 243 K/UL
POTASSIUM SERPL-SCNC: 4.4 MMOL/L
PROT SERPL-MCNC: 7.2 G/DL
QUANTIFERON TB PLUS MITOGEN MINUS NIL: >10 IU/ML
QUANTIFERON TB PLUS NIL: 0.02 IU/ML
QUANTIFERON TB PLUS TB1 MINUS NIL: 0 IU/ML
QUANTIFERON TB PLUS TB2 MINUS NIL: 0 IU/ML
RBC # BLD: 4.86 M/UL
RBC # FLD: 13.4 %
SODIUM SERPL-SCNC: 137 MMOL/L
TRIGL SERPL-MCNC: 50 MG/DL
TSH SERPL-ACNC: 0.89 UIU/ML
WBC # FLD AUTO: 4.87 K/UL

## 2025-01-20 ENCOUNTER — TRANSCRIPTION ENCOUNTER (OUTPATIENT)
Age: 40
End: 2025-01-20

## 2025-01-20 ENCOUNTER — OFFICE (OUTPATIENT)
Facility: LOCATION | Age: 40
Setting detail: OPHTHALMOLOGY
End: 2025-01-20
Payer: COMMERCIAL

## 2025-01-20 DIAGNOSIS — H02.89: ICD-10-CM

## 2025-01-20 DIAGNOSIS — H11.133: ICD-10-CM

## 2025-01-20 LAB — CYTOLOGY CVX/VAG DOC THIN PREP: NORMAL

## 2025-01-20 PROCEDURE — 92004 COMPRE OPH EXAM NEW PT 1/>: CPT | Performed by: STUDENT IN AN ORGANIZED HEALTH CARE EDUCATION/TRAINING PROGRAM

## 2025-01-20 ASSESSMENT — TONOMETRY
OS_IOP_MMHG: 12
OD_IOP_MMHG: 13

## 2025-01-20 ASSESSMENT — CONFRONTATIONAL VISUAL FIELD TEST (CVF)
OS_FINDINGS: FULL
OD_FINDINGS: FULL

## 2025-01-21 ENCOUNTER — TRANSCRIPTION ENCOUNTER (OUTPATIENT)
Age: 40
End: 2025-01-21

## 2025-01-21 PROBLEM — H02.89 MEIBOMIAN GLAND DYSFUNCTION: Status: ACTIVE | Noted: 2025-01-20

## 2025-01-21 PROBLEM — H52.31 ANISOMETROPIA: Status: ACTIVE | Noted: 2025-01-20

## 2025-01-21 PROBLEM — H11.133 CONJUNCTIVAL PIGMENTATIONS; BOTH EYES: Status: ACTIVE | Noted: 2025-01-20

## 2025-01-21 ASSESSMENT — REFRACTION_CURRENTRX
OS_SPHERE: -2.25
OD_SPHERE: -0.50
OD_OVR_VA: 20/
OS_AXIS: 133
OS_VPRISM_DIRECTION: SV
OD_VPRISM_DIRECTION: SV
OS_CYLINDER: -0.50
OD_CYLINDER: -0.50
OD_AXIS: 032
OS_OVR_VA: 20/

## 2025-01-21 ASSESSMENT — KERATOMETRY
OD_K2POWER_DIOPTERS: 45.50
OD_K1POWER_DIOPTERS: 45.00
OS_AXISANGLE_DEGREES: 073
METHOD_AUTO_MANUAL: AUTO
OS_K1POWER_DIOPTERS: 45.00
OD_AXISANGLE_DEGREES: 116
OS_K2POWER_DIOPTERS: 45.50

## 2025-01-21 ASSESSMENT — VISUAL ACUITY
OS_BCVA: 20/20
OD_BCVA: 20/20-1

## 2025-01-21 ASSESSMENT — REFRACTION_AUTOREFRACTION
OD_CYLINDER: -0.75
OD_SPHERE: -0.25
OS_CYLINDER: -0.75
OD_AXIS: 020
OS_SPHERE: -2.50
OS_AXIS: 152

## 2025-01-22 ENCOUNTER — TRANSCRIPTION ENCOUNTER (OUTPATIENT)
Age: 40
End: 2025-01-22

## 2025-01-22 ENCOUNTER — NON-APPOINTMENT (OUTPATIENT)
Age: 40
End: 2025-01-22

## 2025-01-29 NOTE — DIETITIAN INITIAL EVALUATION ADULT. - PERTINENT LABORATORY DATA
01/29/25 3:24 PM     Chart reviewed for Blood Pressure was/were submitted to the patient's insurance.     Erika Lopez MA   [facility] VALUE BASED VIR   POCT glucose: 10/18: 120, 122

## 2025-02-04 ENCOUNTER — RX RENEWAL (OUTPATIENT)
Age: 40
End: 2025-02-04

## 2025-02-06 ENCOUNTER — APPOINTMENT (OUTPATIENT)
Dept: ORTHOPEDIC SURGERY | Facility: CLINIC | Age: 40
End: 2025-02-06
Payer: COMMERCIAL

## 2025-02-06 VITALS — HEIGHT: 59 IN | BODY MASS INDEX: 29.23 KG/M2 | WEIGHT: 145 LBS

## 2025-02-06 DIAGNOSIS — M22.8X9 OTHER DISORDERS OF PATELLA, UNSPECIFIED KNEE: ICD-10-CM

## 2025-02-06 PROCEDURE — 99214 OFFICE O/P EST MOD 30 MIN: CPT

## 2025-02-06 RX ORDER — MELOXICAM 7.5 MG/1
7.5 TABLET ORAL DAILY
Qty: 14 | Refills: 1 | Status: ACTIVE | COMMUNITY
Start: 2025-02-06 | End: 1900-01-01

## 2025-02-26 ENCOUNTER — APPOINTMENT (OUTPATIENT)
Dept: INTERNAL MEDICINE | Facility: CLINIC | Age: 40
End: 2025-02-26
Payer: COMMERCIAL

## 2025-02-26 ENCOUNTER — OUTPATIENT (OUTPATIENT)
Dept: OUTPATIENT SERVICES | Facility: HOSPITAL | Age: 40
LOS: 1 days | End: 2025-02-26

## 2025-02-26 VITALS
BODY MASS INDEX: 28.83 KG/M2 | WEIGHT: 143 LBS | HEIGHT: 59 IN | OXYGEN SATURATION: 97 % | HEART RATE: 85 BPM | SYSTOLIC BLOOD PRESSURE: 113 MMHG | DIASTOLIC BLOOD PRESSURE: 80 MMHG

## 2025-02-26 DIAGNOSIS — Z98.1 ARTHRODESIS STATUS: Chronic | ICD-10-CM

## 2025-02-26 DIAGNOSIS — Z11.51 ENCOUNTER FOR SCREENING FOR HUMAN PAPILLOMAVIRUS (HPV): ICD-10-CM

## 2025-02-26 DIAGNOSIS — Z88.6 ALLERGY STATUS TO ANALGESIC AGENT: ICD-10-CM

## 2025-02-26 DIAGNOSIS — L21.9 SEBORRHEIC DERMATITIS, UNSPECIFIED: ICD-10-CM

## 2025-02-26 DIAGNOSIS — Z01.419 ENCOUNTER FOR GYNECOLOGICAL EXAMINATION (GENERAL) (ROUTINE) W/OUT ABNORMAL FINDINGS: ICD-10-CM

## 2025-02-26 DIAGNOSIS — G44.209 TENSION-TYPE HEADACHE, UNSPECIFIED, NOT INTRACTABLE: ICD-10-CM

## 2025-02-26 DIAGNOSIS — M17.11 UNILATERAL PRIMARY OSTEOARTHRITIS, RIGHT KNEE: ICD-10-CM

## 2025-02-26 PROCEDURE — 99214 OFFICE O/P EST MOD 30 MIN: CPT

## 2025-02-26 PROCEDURE — G2211 COMPLEX E/M VISIT ADD ON: CPT | Mod: NC

## 2025-02-26 RX ORDER — ACETAMINOPHEN, CAFFEINE 500; 65 MG/1; MG/1
500-65 TABLET, FILM COATED ORAL
Refills: 0 | Status: ACTIVE | COMMUNITY
Start: 2025-02-26

## 2025-02-26 RX ORDER — AMITRIPTYLINE HYDROCHLORIDE 10 MG/1
10 TABLET, FILM COATED ORAL
Qty: 30 | Refills: 2 | Status: ACTIVE | COMMUNITY
Start: 2025-02-26 | End: 1900-01-01

## 2025-03-04 ENCOUNTER — RX RENEWAL (OUTPATIENT)
Age: 40
End: 2025-03-04

## 2025-03-04 DIAGNOSIS — M17.11 UNILATERAL PRIMARY OSTEOARTHRITIS, RIGHT KNEE: ICD-10-CM

## 2025-03-18 ENCOUNTER — RX RENEWAL (OUTPATIENT)
Age: 40
End: 2025-03-18

## 2025-03-24 ENCOUNTER — APPOINTMENT (OUTPATIENT)
Dept: NEUROLOGY | Facility: CLINIC | Age: 40
End: 2025-03-24

## 2025-03-24 ENCOUNTER — TRANSCRIPTION ENCOUNTER (OUTPATIENT)
Age: 40
End: 2025-03-24

## 2025-03-31 ENCOUNTER — TRANSCRIPTION ENCOUNTER (OUTPATIENT)
Age: 40
End: 2025-03-31

## 2025-04-11 ENCOUNTER — APPOINTMENT (OUTPATIENT)
Dept: INTERNAL MEDICINE | Facility: CLINIC | Age: 40
End: 2025-04-11

## 2025-04-17 ENCOUNTER — TRANSCRIPTION ENCOUNTER (OUTPATIENT)
Age: 40
End: 2025-04-17

## 2025-05-08 ENCOUNTER — APPOINTMENT (OUTPATIENT)
Dept: DERMATOLOGY | Facility: CLINIC | Age: 40
End: 2025-05-08
Payer: COMMERCIAL

## 2025-05-08 ENCOUNTER — NON-APPOINTMENT (OUTPATIENT)
Age: 40
End: 2025-05-08

## 2025-05-08 VITALS — BODY MASS INDEX: 28.22 KG/M2 | WEIGHT: 140 LBS | HEIGHT: 59 IN

## 2025-05-08 DIAGNOSIS — L65.8 OTHER SPECIFIED NONSCARRING HAIR LOSS: ICD-10-CM

## 2025-05-08 DIAGNOSIS — L70.0 ACNE VULGARIS: ICD-10-CM

## 2025-05-08 DIAGNOSIS — L65.9 NONSCARRING HAIR LOSS, UNSPECIFIED: ICD-10-CM

## 2025-05-08 PROCEDURE — 99214 OFFICE O/P EST MOD 30 MIN: CPT

## 2025-06-02 ENCOUNTER — NON-APPOINTMENT (OUTPATIENT)
Age: 40
End: 2025-06-02

## 2025-07-29 NOTE — ASU DISCHARGE PLAN (ADULT/PEDIATRIC) - CARE PROVIDER_API CALL
- stable  - ACT score 24  - Lungs CTA on exam today, no signs of acute exacerbation  - Continue Symbicort inhaler BID + Albuterol inhaler PRN for SOB and wheezing, refills provided     Stephanie Goodman)  ColonRectal Surgery; Surgery  [address], [address]  [place] [address]  Phone: [phone]  Fax: [phone]  Follow Up Time: